# Patient Record
Sex: MALE | Race: WHITE
[De-identification: names, ages, dates, MRNs, and addresses within clinical notes are randomized per-mention and may not be internally consistent; named-entity substitution may affect disease eponyms.]

---

## 2024-02-05 ENCOUNTER — TRANSCRIBE ORDERS (OUTPATIENT)
Dept: OTHER | Age: 49
End: 2024-02-05

## 2024-02-05 ENCOUNTER — PATIENT OUTREACH (OUTPATIENT)
Dept: UROLOGY | Facility: CLINIC | Age: 49
End: 2024-02-05
Payer: COMMERCIAL

## 2024-02-05 DIAGNOSIS — N28.89 LEFT RENAL MASS: Primary | ICD-10-CM

## 2024-02-05 NOTE — TELEPHONE ENCOUNTER
Writer reached out to pt per the providers guidance per a direct referral.     No answer, writer not able to leave a VM .     Will continue to follow for scheduling.

## 2024-02-19 NOTE — TELEPHONE ENCOUNTER
Action 2024 JTV 12:12 PM    Action Taken CSS sent an urgent request to [employer] for images.  Trackin     Action 2024 jtv 1:55 PM    Action Taken CSS sent a 2nd request to [employer] for images.      Action 2024 Jtv 8:17 AM    Action Taken CSS called [employer]. CSS spoke with Radiology. Radiology dept confirmed that their Images release person is not available and will return call. CSS sent an urgent message to Nurse and Provider.     @9:19 AM, Film Release from [employer] returned call and stated that she will have the images burned onto a CD and mailed out end of today. CSS sent a message to Clinic Nurse, Staff, and Provider with update.    *Update Provider and Tonya once images has been received.     CSS called [employer] and confirmed that images cannot be pushed to . CSS sent an urgent request to [employer] for images.     Trackin       Action 24  ELLIS 10:52 AM    Action Taken  Imaging received. Uploaded and resolved in PACS. Dr. Long and Tonya notified.      MEDICAL RECORDS REQUEST   [facility]  Urology Clinic  [address] [place] [address]  PHONE: [phone]  Fax: [phone]        FUTURE VISIT INFORMATION                                                   Bradford Ruelas, : 1975 scheduled for future visit at [facility]    APPOINTMENT INFORMATION:  Date: 2024  Provider:  Aurelio Long MD  Reason for Visit/Diagnosis: Left renal mass    REFERRAL INFORMATION:  Referring provider:  Abelardo Lowery MD, MD @ [facility]      RECORDS REQUESTED FOR VISIT                                                     NOTES  STATUS/DETAILS   OFFICE NOTE from referring provider  Yes, 2023 -- Abelardo Lowery MD, MD @ [facility]   OFFICE NOTE from other specialist  Yes, 2023 -- Hang [facility]argas MD  [facility]    OPERATIVE REPORT  yes   MEDICATION LIST  yes   LABS     URINALYSIS (UA)  yes   IMAGES pending Yes, [facility]  01/23/2024 -- CT ABD   01/18/2024 -- US RETROPERITONEAL    [facility]   12/05/2023, 11/06/2023, 10/09/2023 -- CT ABD PELVIS       PRE-VISIT CHECKLIST      Joint diagnostic appointment coordinated correctly          (ensure right order & amount of time) Yes   RECORD COLLECTION COMPLETE If no, please explain pending

## 2024-02-22 ENCOUNTER — PRE VISIT (OUTPATIENT)
Dept: UROLOGY | Facility: CLINIC | Age: 49
End: 2024-02-22
Payer: COMMERCIAL

## 2024-02-22 NOTE — TELEPHONE ENCOUNTER
Reason for visit: consult      Dx/Hx/Sx: renal mass, left     Records/imaging/labs/orders: in epic     At Rooming: standard

## 2024-03-05 ENCOUNTER — PRE VISIT (OUTPATIENT)
Dept: UROLOGY | Facility: CLINIC | Age: 49
End: 2024-03-05

## 2024-03-05 ENCOUNTER — OFFICE VISIT (OUTPATIENT)
Dept: UROLOGY | Facility: CLINIC | Age: 49
End: 2024-03-05
Payer: COMMERCIAL

## 2024-03-05 VITALS — DIASTOLIC BLOOD PRESSURE: 79 MMHG | HEART RATE: 48 BPM | SYSTOLIC BLOOD PRESSURE: 113 MMHG

## 2024-03-05 DIAGNOSIS — N28.89 LEFT RENAL MASS: Primary | ICD-10-CM

## 2024-03-05 DIAGNOSIS — K43.9 VENTRAL HERNIA WITHOUT OBSTRUCTION OR GANGRENE: ICD-10-CM

## 2024-03-05 PROBLEM — F09 MILD COGNITIVE DISORDER: Status: ACTIVE | Noted: 2017-11-20

## 2024-03-05 PROBLEM — E66.01 MORBID OBESITY DUE TO EXCESS CALORIES (H): Status: ACTIVE | Noted: 2017-06-15

## 2024-03-05 PROBLEM — I89.0 LYMPHEDEMA: Status: ACTIVE | Noted: 2021-12-11

## 2024-03-05 PROBLEM — K42.9 UMBILICAL HERNIA WITHOUT OBSTRUCTION OR GANGRENE: Status: ACTIVE | Noted: 2021-02-22

## 2024-03-05 PROBLEM — F10.21 ALCOHOL DEPENDENCE IN REMISSION (H): Status: ACTIVE | Noted: 2017-11-20

## 2024-03-05 PROBLEM — E88.810 METABOLIC SYNDROME: Status: ACTIVE | Noted: 2023-09-21

## 2024-03-05 PROCEDURE — 99204 OFFICE O/P NEW MOD 45 MIN: CPT | Performed by: UROLOGY

## 2024-03-05 RX ORDER — ASPIRIN 325 MG
325 TABLET ORAL
COMMUNITY

## 2024-03-05 RX ORDER — ATENOLOL 100 MG/1
200 TABLET ORAL DAILY
COMMUNITY
Start: 2022-08-13

## 2024-03-05 RX ORDER — SACCHAROMYCES BOULARDII 250 MG
250 CAPSULE ORAL
COMMUNITY
Start: 2024-01-16 | End: 2024-07-12

## 2024-03-05 RX ORDER — HYDROCHLOROTHIAZIDE 12.5 MG/1
CAPSULE ORAL
COMMUNITY
Start: 2023-10-07

## 2024-03-05 RX ORDER — AMLODIPINE BESYLATE 10 MG/1
TABLET ORAL
COMMUNITY
Start: 2022-06-28

## 2024-03-05 RX ORDER — SEMAGLUTIDE 0.68 MG/ML
0.5 INJECTION, SOLUTION SUBCUTANEOUS
COMMUNITY
Start: 2023-09-21 | End: 2024-03-28

## 2024-03-05 RX ORDER — POTASSIUM CHLORIDE 1500 MG/1
40 TABLET, EXTENDED RELEASE ORAL 2 TIMES DAILY
COMMUNITY

## 2024-03-05 RX ORDER — OXYCODONE HYDROCHLORIDE 5 MG/1
10 TABLET ORAL
COMMUNITY
Start: 2022-09-05

## 2024-03-05 RX ORDER — DULOXETIN HYDROCHLORIDE 30 MG/1
30 CAPSULE, DELAYED RELEASE ORAL DAILY
COMMUNITY
Start: 2022-11-23

## 2024-03-05 RX ORDER — GABAPENTIN 300 MG/1
CAPSULE ORAL
COMMUNITY
Start: 2024-01-16

## 2024-03-05 RX ORDER — HYDROXYZINE PAMOATE 25 MG/1
25-50 CAPSULE ORAL
COMMUNITY
Start: 2023-10-30

## 2024-03-05 RX ORDER — SUMATRIPTAN 100 MG/1
100 TABLET, FILM COATED ORAL
COMMUNITY
Start: 2022-09-06

## 2024-03-05 ASSESSMENT — PAIN SCALES - GENERAL: PAINLEVEL: SEVERE PAIN (6)

## 2024-03-05 ASSESSMENT — PATIENT HEALTH QUESTIONNAIRE - PHQ9: SUM OF ALL RESPONSES TO PHQ QUESTIONS 1-9: 11

## 2024-03-05 NOTE — OUTPATIENT NURSE NOTE
Spoke with pt regarding depression screening- no current SI or plan to harm self. Pt endorses situational depression related to diagnosis. Offered mental health resources to pt, declines follow up at this time.    Gerda Avalos RN
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
## 2024-03-05 NOTE — NURSING NOTE
Chief Complaint   Patient presents with    Consult     Left kidney mass       Blood pressure 113/79, pulse (!) 48. There is no height or weight on file to calculate BMI.    There is no problem list on file for this patient.      Allergies   Allergen Reactions    Gramineae Pollens Itching       Current Outpatient Medications   Medication Sig Dispense Refill    amLODIPine (NORVASC) 10 MG tablet       aspirin (ASA) 325 MG tablet Take 325 mg by mouth      atenolol (TENORMIN) 100 MG tablet       DULoxetine (CYMBALTA) 30 MG capsule Take one capsule PO QAM and two capsules PO QHS      gabapentin (NEURONTIN) 300 MG capsule Take 300 mg by mouth 4 times daily      hydrochlorothiazide (MICROZIDE) 12.5 MG capsule       hydrOXYzine palomo (VISTARIL) 25 MG capsule Take 25-50 mg by mouth      oxyCODONE (ROXICODONE) 5 MG tablet Take 10 mg by mouth      potassium chloride eladio ER (KLOR-CON M20) 20 MEQ CR tablet Take 40 mEq by mouth      saccharomyces boulardii (FLORASTOR) 250 MG capsule Take 250 mg by mouth      semaglutide (OZEMPIC, 0.25 OR 0.5 MG/DOSE,) 2 MG/3ML pen Inject 0.5 mg Subcutaneous      SUMAtriptan (IMITREX) 100 MG tablet Take 100 mg by mouth         Social History     Tobacco Use    Smoking status: Never     Passive exposure: Never    Smokeless tobacco: Never       Rainer Gomez  3/5/2024  4:15 PM

## 2024-03-05 NOTE — PATIENT INSTRUCTIONS
Please follow up with Dr. Long in 6 months with a CT and labs.    We will obtain outside scans and review. Will call if changes in plan.  Plan continued weight loss.  Follow-up in 6 months with scan, labs, and follow-up with Dr. Long, as well as hernia surgeon at [facility].
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
## 2024-03-06 NOTE — TELEPHONE ENCOUNTER
REFERRAL INFORMATION:  Referring Provider:  Aurelio Long MD   Referring Clinic:  internal UROLOGY DEPT   Reason for Visit/Diagnosis: Ventral hernia without obstruction or gangrene        FUTURE VISIT INFORMATION:  Appointment Date: 3/8/2024  Appointment Time: 1:45pm      NOTES RECORD STATUS  DETAILS   OFFICE NOTE from Referring Provider Internal    OFFICE NOTE from Other Specialists Internal Urology Office Visit- 3/5/2024   PERTINENT LABS Internal    IMAGING (CT, MRI, US, XR)  Care Everywhere- [facility] CT Abdomen Pelvis 1/23/2024      Records Requested    Facility  [facility] [place] Ph: [phone]     Outcome 3/6/2024 11:12AM MOO     I called [facility] - Flory Anne requested the scans to mailed out on a DICOM disc for the Urology appt. [facility] confirmed the images should arrive today.    * 3/7/24 2:23 PM Images received from [facility] and uploaded into PACs. - Sarah

## 2024-03-08 ENCOUNTER — PRE VISIT (OUTPATIENT)
Dept: SURGERY | Facility: CLINIC | Age: 49
End: 2024-03-08

## 2024-03-08 ENCOUNTER — OFFICE VISIT (OUTPATIENT)
Dept: SURGERY | Facility: CLINIC | Age: 49
End: 2024-03-08
Attending: UROLOGY
Payer: COMMERCIAL

## 2024-03-08 VITALS
DIASTOLIC BLOOD PRESSURE: 76 MMHG | HEIGHT: 74 IN | BODY MASS INDEX: 40.43 KG/M2 | SYSTOLIC BLOOD PRESSURE: 144 MMHG | HEART RATE: 54 BPM | OXYGEN SATURATION: 96 % | WEIGHT: 315 LBS

## 2024-03-08 DIAGNOSIS — K43.9 VENTRAL HERNIA WITHOUT OBSTRUCTION OR GANGRENE: ICD-10-CM

## 2024-03-08 PROCEDURE — 99203 OFFICE O/P NEW LOW 30 MIN: CPT | Performed by: SURGERY

## 2024-03-08 ASSESSMENT — PAIN SCALES - GENERAL: PAINLEVEL: NO PAIN (0)

## 2024-03-08 NOTE — PROGRESS NOTES
"New Hernia Consultation Note      Bradford Ruelas  [id number]  1975    Requesting Provider: Aurelio Terrell    Dear Hang Vargas MD,    I was asked by Aurelio Long to see this patient for the following problem: Bradford Ruelas is a 48 year old male who presents to clinic today for the following health issues       CHIEF COMPLAINT:      3/8/2024     2:05 PM   Chief Complaint Reviewed With Patient   I am here today to be seen for: Umbilical Hernia         ASSESSMENT/PLAN:  ventral  Hernia size is more than 5cm in size.    Assessment & Plan   Problem List Items Addressed This Visit    None  Visit Diagnoses       Ventral hernia without obstruction or gangrene        Relevant Orders    Adult Comprehensive Weight Management  Referral         30 minutes spent by me on the date of the encounter doing chart review, history and exam, documentation and further activities per the note    Needs weight of 240 to seriously consider complex VHR (with or without concomitant partial nephrectomy); discussed that recurrence is guaranteed without signfiicant weight loss    MWM consult for aggressive AOMs      Thomas Guerrero MD              HISTORY OF PRESENT ILLNESS:  Location: upper ventral wall  Severity: Moderate    Long standin VH from 2007 trauma laparotomy. BMI 47.     Pending partial nephrectomy (likely from L paramedian rather than midline)             3/8/2024     2:05 PM   Timing of Hernia Reviewed With Patient   The onset of my hernia symptoms was: Gradual   My symptoms are: Constant           NUTRITIONAL STATUS:  No results found for: \"ALBUMIN\"    Body mass index is 46.34 kg/m .    Patient is not immunosuppressed.    Patient is not a current smoker.    No past medical history on file.    Patient Active Problem List   Diagnosis    Alcohol dependence in remission (H)    Episodic mood disorder (H24)    Lymphedema    Metabolic syndrome    Mild cognitive disorder    Morbid obesity " "due to excess calories (H)    Primary hypertension    Renal mass    Umbilical hernia without obstruction or gangrene       No past surgical history on file.    MEDICATIONS:  Current Outpatient Medications   Medication    amLODIPine (NORVASC) 10 MG tablet    aspirin (ASA) 325 MG tablet    atenolol (TENORMIN) 100 MG tablet    DULoxetine (CYMBALTA) 30 MG capsule    gabapentin (NEURONTIN) 300 MG capsule    hydrochlorothiazide (MICROZIDE) 12.5 MG capsule    hydrOXYzine palomo (VISTARIL) 25 MG capsule    oxyCODONE (ROXICODONE) 5 MG tablet    potassium chloride eladio ER (KLOR-CON M20) 20 MEQ CR tablet    saccharomyces boulardii (FLORASTOR) 250 MG capsule    semaglutide (OZEMPIC, 0.25 OR 0.5 MG/DOSE,) 2 MG/3ML pen    SUMAtriptan (IMITREX) 100 MG tablet     No current facility-administered medications for this visit.       ALLERGIES:  Allergies   Allergen Reactions    Gramineae Pollens Itching       Social History     Socioeconomic History    Marital status: Patient Declined     Spouse name: None    Number of children: None    Years of education: None    Highest education level: None   Tobacco Use    Smoking status: Never     Passive exposure: Never    Smokeless tobacco: Never       No family history on file.    ROS    na    PHYSICAL EXAM:  Objective    BP (!) 144/76 (BP Location: Left arm, Patient Position: Sitting, Cuff Size: Adult Regular)   Pulse 54   Ht 1.88 m (6' 2\")   Wt (!) 163.7 kg (360 lb 14.4 oz)   SpO2 96%   BMI 46.34 kg/m    BP (!) 144/76 (BP Location: Left arm, Patient Position: Sitting, Cuff Size: Adult Regular)   Pulse 54   Ht 1.88 m (6' 2\")   Wt (!) 163.7 kg (360 lb 14.4 oz)   SpO2 96%   BMI 46.34 kg/m    Body mass index is 46.34 kg/m .  Physical Exam   Excess abdominal weight with alrge VH  Sincerely,    Thomas Guerrero MD      "

## 2024-03-08 NOTE — NURSING NOTE
"Chief Complaint   Patient presents with    New Patient     Ventral hernia       Vitals:    03/08/24 1405   BP: (!) 144/76   BP Location: Left arm   Patient Position: Sitting   Cuff Size: Adult Regular   Pulse: 54   SpO2: 96%   Weight: (!) 163.7 kg (360 lb 14.4 oz)   Height: 1.88 m (6' 2\")       Body mass index is 46.34 kg/m .                          Lcuas Larson, EMT    "

## 2024-03-08 NOTE — LETTER
"3/8/2024       RE: Bradford Ruelas   [facility] [address]     Dear Colleague,    Thank you for referring your patient, Bradford Ruelas, to the [facility] [place] at [facility]. Please see a copy of my visit note below.    New Hernia Consultation Note      Bradford Ruelas  [id number]  1975    Requesting Provider: Aurelio Terrell    Dear Hang Vargas MD,    I was asked by Aurelio Long to see this patient for the following problem: Bradford Ruelas is a 48 year old male who presents to clinic today for the following health issues       CHIEF COMPLAINT:      3/8/2024     2:05 PM   Chief Complaint Reviewed With Patient   I am here today to be seen for: Umbilical Hernia         ASSESSMENT/PLAN:  ventral  Hernia size is more than 5cm in size.    Assessment & Plan  Problem List Items Addressed This Visit    None  Visit Diagnoses       Ventral hernia without obstruction or gangrene        Relevant Orders    Adult Comprehensive Weight Management  Referral         30 minutes spent by me on the date of the encounter doing chart review, history and exam, documentation and further activities per the note    Needs weight of 240 to seriously consider complex VHR (with or without concomitant partial nephrectomy); discussed that recurrence is guaranteed without signfiicant weight loss    MW consult for aggressive AOMs      Thomas Guerrero MD              HISTORY OF PRESENT ILLNESS:  Location: upper ventral wall  Severity: Moderate    Long standin VH from 2007 trauma laparotomy. BMI 47.     Pending partial nephrectomy (likely from L paramedian rather than midline)             3/8/2024     2:05 PM   Timing of Hernia Reviewed With Patient   The onset of my hernia symptoms was: Gradual   My symptoms are: Constant           NUTRITIONAL STATUS:  No results found for: \"ALBUMIN\"    Body mass " "index is 46.34 kg/m .    Patient is not immunosuppressed.    Patient is not a current smoker.    No past medical history on file.    Patient Active Problem List   Diagnosis    Alcohol dependence in remission (H)    Episodic mood disorder (H24)    Lymphedema    Metabolic syndrome    Mild cognitive disorder    Morbid obesity due to excess calories (H)    Primary hypertension    Renal mass    Umbilical hernia without obstruction or gangrene       No past surgical history on file.    MEDICATIONS:  Current Outpatient Medications   Medication    amLODIPine (NORVASC) 10 MG tablet    aspirin (ASA) 325 MG tablet    atenolol (TENORMIN) 100 MG tablet    DULoxetine (CYMBALTA) 30 MG capsule    gabapentin (NEURONTIN) 300 MG capsule    hydrochlorothiazide (MICROZIDE) 12.5 MG capsule    hydrOXYzine palomo (VISTARIL) 25 MG capsule    oxyCODONE (ROXICODONE) 5 MG tablet    potassium chloride eladio ER (KLOR-CON M20) 20 MEQ CR tablet    saccharomyces boulardii (FLORASTOR) 250 MG capsule    semaglutide (OZEMPIC, 0.25 OR 0.5 MG/DOSE,) 2 MG/3ML pen    SUMAtriptan (IMITREX) 100 MG tablet     No current facility-administered medications for this visit.       ALLERGIES:  Allergies   Allergen Reactions    Gramineae Pollens Itching       Social History     Socioeconomic History    Marital status: Patient Declined     Spouse name: None    Number of children: None    Years of education: None    Highest education level: None   Tobacco Use    Smoking status: Never     Passive exposure: Never    Smokeless tobacco: Never       No family history on file.    ROS    na    PHYSICAL EXAM:  Objective   BP (!) 144/76 (BP Location: Left arm, Patient Position: Sitting, Cuff Size: Adult Regular)   Pulse 54   Ht 1.88 m (6' 2\")   Wt (!) 163.7 kg (360 lb 14.4 oz)   SpO2 96%   BMI 46.34 kg/m    BP (!) 144/76 (BP Location: Left arm, Patient Position: Sitting, Cuff Size: Adult Regular)   Pulse 54   Ht 1.88 m (6' 2\")   Wt (!) 163.7 kg (360 lb 14.4 oz)   SpO2 96% "   BMI 46.34 kg/m    Body mass index is 46.34 kg/m .  Physical Exam   Excess abdominal weight with alrge VH        Again, thank you for allowing me to participate in the care of your patient.      Sincerely,    Thomas Guerrero MD

## 2024-03-12 ENCOUNTER — PATIENT OUTREACH (OUTPATIENT)
Dept: UROLOGY | Facility: CLINIC | Age: 49
End: 2024-03-12
Payer: COMMERCIAL

## 2024-03-12 NOTE — TELEPHONE ENCOUNTER
Writer reached out to pt on behalf of the provider to inform him that the provider has reviewed the imaging and recommends pt to continue with the weight loss plan that has been previously discussed.     Pt notes that he met with a general surgeon for his hernia repair on Friday and per their recommendations, they want pt to be down to 240# before considering surgery. Pt reports that his current weight is 360#. Writer provided support.     Writer also informed pt that the provider would like to see him back in 6 months with repeat imaging. Pt has an appt scheduled for Sept.     Will continue to follow as needed.

## 2024-03-28 ENCOUNTER — TELEPHONE (OUTPATIENT)
Dept: ENDOCRINOLOGY | Facility: CLINIC | Age: 49
End: 2024-03-28

## 2024-03-28 ENCOUNTER — OFFICE VISIT (OUTPATIENT)
Dept: ENDOCRINOLOGY | Facility: CLINIC | Age: 49
End: 2024-03-28
Attending: SURGERY
Payer: COMMERCIAL

## 2024-03-28 VITALS
HEART RATE: 52 BPM | DIASTOLIC BLOOD PRESSURE: 79 MMHG | RESPIRATION RATE: 16 BRPM | SYSTOLIC BLOOD PRESSURE: 127 MMHG | WEIGHT: 315 LBS | HEIGHT: 74 IN | OXYGEN SATURATION: 99 % | BODY MASS INDEX: 40.43 KG/M2

## 2024-03-28 DIAGNOSIS — E66.813 CLASS 3 SEVERE OBESITY WITH SERIOUS COMORBIDITY AND BODY MASS INDEX (BMI) OF 45.0 TO 49.9 IN ADULT, UNSPECIFIED OBESITY TYPE (H): Primary | ICD-10-CM

## 2024-03-28 DIAGNOSIS — E66.01 CLASS 3 SEVERE OBESITY WITH SERIOUS COMORBIDITY AND BODY MASS INDEX (BMI) OF 45.0 TO 49.9 IN ADULT, UNSPECIFIED OBESITY TYPE (H): Primary | ICD-10-CM

## 2024-03-28 DIAGNOSIS — K43.9 VENTRAL HERNIA WITHOUT OBSTRUCTION OR GANGRENE: ICD-10-CM

## 2024-03-28 PROBLEM — G44.229 CHRONIC TENSION HEADACHES: Status: RESOLVED | Noted: 2019-08-05 | Resolved: 2024-03-28

## 2024-03-28 PROBLEM — R74.02 ELEVATION OF LEVELS OF LACTIC ACID DEHYDROGENASE (LDH): Status: RESOLVED | Noted: 2018-11-05 | Resolved: 2024-03-28

## 2024-03-28 PROBLEM — Z22.322 MRSA (METHICILLIN RESISTANT STAPH AUREUS) CULTURE POSITIVE: Status: ACTIVE | Noted: 2021-12-11

## 2024-03-28 PROBLEM — M79.604 LEG PAIN, DIFFUSE, RIGHT: Status: ACTIVE | Noted: 2021-12-21

## 2024-03-28 PROCEDURE — 99205 OFFICE O/P NEW HI 60 MIN: CPT
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
## 2024-03-28 NOTE — PATIENT INSTRUCTIONS
"Pascual Felder, it was nice to meet you today!  Thank you for allowing us the privilege of caring for you. We hope we provided you with the excellent service you deserve.   Please let us know if there is anything else we can do for you so that we can be sure you are completely satisfied with your care experience.    To ensure the quality of our services you may be receiving a patient satisfaction survey from an independent patient satisfaction monitoring company.    The greatest compliment you can give is a \"Likely to Recommend\"    Your visit was with Maria M Lui PA-C today.    Instructions per today's visit:     Will reach out to psychiatrist about starting Phentermine or Topiramate  MTM pharmacist in 6 weeks after medication start   Daphne Murphy, Cesilia, or Lulu in 3 months     ___________________________________________________________________________  Important contact and scheduling information:  Please call our contact center at [phone] to schedule your next appointments.  For any nursing questions or concerns call Pattie Love LPN at [phone] or Sarah Sotomayor RN at [phone]  Please call during clinic hours Monday through Friday 8:00a - 4:00p if you have questions or you can contact us via FND at anytime and we will reply during clinic hours.    Lab results will be communicated through My Chart or letter (if My Chart not used). Please call the clinic if you have not received communication after 1 week or if you have any questions.?  Clinic Fax: [phone]  __________________________________________________________________________    If labs were ordered today:    Please make an appointment to have them drawn at your convenience.     To schedule the Lab Appointment using FND:  Select \"Schedule an Appointment\"  Select \"Lab Only\"  For \"A couple of questions\", select \"Other\"  For \"Which locations work for you?, select the location and set up the appointment    To schedule by phone call " [phone] to schedule a lab only appointment at any [facility] lab.  ___________________________________________________________________________  Work with [facility] !  Virtual Sessions are Available through [facility] Weight Management Clinics    To learn more, call to schedule a free, Health  Q&A appointment: [phone]     What is Health Coaching?  Do you know what you are supposed to do, but you just aren't doing it?  Then, HEALTH COACHING may help you!   Get unstuck and move forward with the support of a professionally trained NBC-HWC (National Board-Certified Health and ) who uses evidence-based approaches to help you move forward with healthy lifestyle changes in the areas of weight loss, stress management and overall well-being.    Health Coaches help you identify goals that will work best for you. Health Coaches provide support and encouragement with overcoming barriers and help you to find inspiration and motivation to lead a healthy lifestyle.    Option one:  Health Coaching 3-Pack; Three, 30-minute Health Coaching Visits, for $99  Visits are done virtually (phone or video)  This is a self pay service; we do not accept insurance for john coaching.    Option two:   The 24 week Plan; 11 Health Coaching Visits, and a 7 months subscription to [employer]-- on-demand fitness, nutrition and mindfulness classes, for $499 (employee discounts may be available). Participants will also meet regularly with a weight management Medical Provider and a Registered/Licensed Dietician.  This is a self-pay service; we do not accept insurance for health coaching.    To Schedule a free Health  Q&A appointment to learn more,  call [phone].  ____________________________________________________________________  [facility]  Healthy Lifestyle Group    Healthy Lifestyle Group  This is a 60 minute virtual coaching group for those who want  "to lead a healthier lifestyle. Come together to set goals and overcome barriers in a supportive group environment. We will address the four pillars of health--nutrition, exercise, sleep and emotional well-being.  This group is highly recommended for those who are participating in the 24 week Healthy Lifestyle Plan and our Health Coaching sessions.    WHEN: This group meets the first Friday of the month, 12:30 PM - 1:30 PM online, via a zoom meeting.      FACILITATOR: Led by National Board Certified Health and , Yelitza Wolff [facility].    TO REGISTER: Please call the Call Center at [phone] to register. You will get an appointment to attend in [place]. Fifteen minutes prior to the meeting, complete the e-check in and you will get the link to join the meeting.  There is no charge to attend this group and space is limited.      2023 and 2024 Meeting Topics and Dates:    November 3: Introduction to Mindfulness (Learn simple and effective mindfulness practices and how it can benefit you)    December 8: Let's Talk (guided discussion on our wins and challenges)    January 5: New Years Vision: Manifest your Best 2024! (Guided imagery,  journaling and discussion)    February 2: Let's Talk    March 1: 10 Percent Happier by Raj Godwin (Book Bites; a guided discussion on the nuggets of wisdom from favorite wellness books; no need to read the book but highly encouraged)    April 5: Let's Talk    May 3: \"Essentialism; The Disciplined Pursuit of Less by Tyson Fortune (book bites discussion)    June 7: Let's Talk    July 5: NO MEETING, off for the 4th of July Holiday    August 2: The Blue Zones, Secrets for Living a Longer Life by Raj Faria (book bites discussion)      If you would like bariatric surgery specific support group info please let your care team know.         Thank you,   [facility] Comprehensive Weight Management Team      PHENTERMINE    We are considering starting Phentermine. Take one tablet " in the morning.      Phentermine is being prescribed because you identified hunger as one of the main causes for your extra weight.      Our patients on Phentermine find that they:    >feel less hunger    >find it easier to push the plate away   >have an easier time eating less    For some of our patients, these feelings are very real and immediate. For other patients, the feelings are less obvious. They don't feel much of a change but find they've lost weight. Like all weight loss medications, Phentermine  works best when you help it work. This means:  1. Having less tempting high calorie (fattening) food around the house or office. (For people with strong cravings this is very important.)   2. Staying away from situations or people that may trigger your cravings .   3. Eating out only one time or less each week.  4. Eating your meals at a table with the TV or computer off.    Side-effects. Phentermine is generally well tolerated. The main side-effects we see are feelings of racing pulse or rapid heart beat. Some people can get an elevated blood pressure. Because of this we may have you come back within a week or so of starting the medication for a blood pressure check.        For any questions or concerns please send a EarlySense message to our team or call our weight management call center at [phone] during regular business hours. For questions during evenings or weekends your messages will be addressed during the next business day.  For emergencies please call 911 or seek immediate medical care.      In order to get refills of this or any medication we prescribe you must be seen in the medical weight mgmt clinic every 2-3 months. Please have your pharmacy fax a refill request to [phone].      TOPIRAMATE (TOPAMAX)    We are considering starting topiramate at bedtime.  Start one tab, 25 mg, for a week. Go up to 50 mg (2 tabs) for the next week. At the third week, take  3 tabs (75 mg).  Stay at 3 tabs until  you are seen again.       Topiramate (Topamax) is a medication that is used most often to treat migraine headaches or for seizures. It has also been found to help with weight loss. Although it's not currently FDA approved for weight loss, it has been used safely for a number of years to help people who are carrying extra weight.     Just how topiramate helps with weight loss has not been exactly determined. However it seems to work on areas of the brain to quiet down signals related to eating.      Topiramate may make you:    >feel less interest in eating in between meals   >think less about food and eating   >find it easier to push the plate away   >find giving up pop easier    >have an easier time eating less    For some of our patients, the pills work right away. They feel and think quite differently about food. Other patients don't feel much of a change but find in fact they have lost weight! Like all weight loss medications, topiramate works best when you help it work.  This means:    1) Have less tempting high calorie (fattening) food around the house or office    2) Have lower calorie food (fruits, vegetables,low fat meats and dairy) for snacks    3) Eat out only one time or less each week.   4) Eat your meals at a table with the TV or computer off.    Side-effects. Topiramate is generally well tolerated. The main side-effects we see are:   Tingling in hands,feet, or face (usually not very troublesome)   Mental confusion and word finding trouble (about 10% of patients have this.)     Feeling sleepy or a bit dopey- this goes away very soon after starting.    One of the dangers of topiramate is the possibility of birth defects--if you get pregnant when you are on it, there is the risk that your baby will be born with a cleft lip or palate.  If you are on topiramate and of child bearing age, you need to be on a reliable form of birth control or refrain from sexual intercourse.     Please refer to the pharmacy  insert for more information on side-effects. Since many pharmacists are not familiar with the use of topiramate in weight loss, calling the clinic will get you the most accurate information on the use of this medication for weight loss.    For any questions or concerns please send a [employer]Match message to our team or call our weight management call center at [phone] during regular business hours. For questions during evenings or weekends your messages will be addressed during the next business day.  For emergencies please call 911 or seek immediate medical care.     (Do not stop taking it if you don't think it's working. For some people it works even though they do not feel much different.)     In order to get refills of this or any medication we prescribe you must be seen in the medical weight mgmt clinic every 3-6 months. Please have your pharmacy fax a refill request to [phone].

## 2024-03-28 NOTE — TELEPHONE ENCOUNTER
Please complete forms before appointment on MyChart or in-office. Takes about 1 hour so please arrive at 2pm for 3pm appointment to complete them in office before appointment.
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
## 2024-04-02 ENCOUNTER — TELEPHONE (OUTPATIENT)
Dept: ENDOCRINOLOGY | Facility: CLINIC | Age: 49
End: 2024-04-02
Payer: COMMERCIAL

## 2024-04-02 PROBLEM — E66.813 CLASS 3 SEVERE OBESITY WITH SERIOUS COMORBIDITY AND BODY MASS INDEX (BMI) OF 45.0 TO 49.9 IN ADULT, UNSPECIFIED OBESITY TYPE (H): Status: ACTIVE | Noted: 2017-06-15
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
## 2024-04-02 NOTE — TELEPHONE ENCOUNTER
Called Dr. Soliz, psychiatrist, to discuss starting weight loss medications in regards to mental health. 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 Reviewed starting either phentermine or Topiramate. Nurse of Dr. Soliz called back and stated he had no concern with starting either of these medications. I will discuss further with patient.     Maria M Lui PA-C  [facility]

## 2024-04-03 ENCOUNTER — TELEPHONE (OUTPATIENT)
Dept: ENDOCRINOLOGY | Facility: CLINIC | Age: 49
End: 2024-04-03
Payer: COMMERCIAL

## 2024-04-03 DIAGNOSIS — E66.01 CLASS 3 SEVERE OBESITY WITH SERIOUS COMORBIDITY AND BODY MASS INDEX (BMI) OF 45.0 TO 49.9 IN ADULT, UNSPECIFIED OBESITY TYPE (H): Primary | ICD-10-CM

## 2024-04-03 DIAGNOSIS — E66.813 CLASS 3 SEVERE OBESITY WITH SERIOUS COMORBIDITY AND BODY MASS INDEX (BMI) OF 45.0 TO 49.9 IN ADULT, UNSPECIFIED OBESITY TYPE (H): Primary | ICD-10-CM

## 2024-04-03 RX ORDER — PHENTERMINE HYDROCHLORIDE 15 MG/1
15 CAPSULE ORAL EVERY MORNING
Qty: 30 CAPSULE | Refills: 1 | Status: SHIPPED | OUTPATIENT
Start: 2024-04-03 | End: 2024-06-10

## 2024-04-03 NOTE — TELEPHONE ENCOUNTER
Called patient to discuss weight loss medications. Reviewed Phentermine and Topiramate. He is concerned about starting Topiramate for baseline paresthesia and brain fog. Will send in phentermine and will monitor blood pressure and mood. No contraindications. Discussed side effects, risks, and benefits.    Plan:   - Phentermine 15mg in the morning. Check blood pressure in 1-2 weeks after start   - MTM pharmacist in 4 weeks   - Daphne Murphy, Cesilia, or Lulu in 3 months     Maria M Lui PA-C  [facility]

## 2024-06-10 ENCOUNTER — TELEPHONE (OUTPATIENT)
Dept: ENDOCRINOLOGY | Facility: CLINIC | Age: 49
End: 2024-06-10
Payer: COMMERCIAL

## 2024-06-10 DIAGNOSIS — E66.813 CLASS 3 SEVERE OBESITY WITH SERIOUS COMORBIDITY AND BODY MASS INDEX (BMI) OF 45.0 TO 49.9 IN ADULT, UNSPECIFIED OBESITY TYPE (H): ICD-10-CM

## 2024-06-10 DIAGNOSIS — E66.01 CLASS 3 SEVERE OBESITY WITH SERIOUS COMORBIDITY AND BODY MASS INDEX (BMI) OF 45.0 TO 49.9 IN ADULT, UNSPECIFIED OBESITY TYPE (H): ICD-10-CM

## 2024-06-10 RX ORDER — PHENTERMINE HYDROCHLORIDE 15 MG/1
15 CAPSULE ORAL EVERY MORNING
Qty: 30 CAPSULE | Refills: 0 | Status: SHIPPED | OUTPATIENT
Start: 2024-06-10 | End: 2024-06-26

## 2024-06-10 RX ORDER — PHENTERMINE HYDROCHLORIDE 15 MG/1
15 CAPSULE ORAL EVERY MORNING
Qty: 30 CAPSULE | Refills: 0 | OUTPATIENT
Start: 2024-06-10

## 2024-06-10 NOTE — TELEPHONE ENCOUNTER
Pt. would like a refill on his medication and he took the last one on last Friday. Pt. Is on the third day without the medication.He called in the prescription on Wednesday. Pt. States the [facility] stated that he did not have any refills. Pt. also needs direction on dosage. Pt. Has been following the direction of Maria M Lui with one pill in the morning. The pharmacist at the [facility] stated that Pt. Was supposed to take the prescription once in the morning and once in the evening.  Pt. states that the pharmacy in [place] stated that he should be taking the medication in the morning and at bed time. Please advise Pt. At [phone].

## 2024-06-10 NOTE — TELEPHONE ENCOUNTER
phentermine 15 MG capsule   Disp-30 capsule, R-1,   Start: 04/03/2024     3/28/2024  [facility] Maria M Peters PA-C  Surgery    Nv: 7/8/24    Routed because: controlled.

## 2024-06-10 NOTE — TELEPHONE ENCOUNTER
Medication Question or Refill    Contacts         Type Contact Phone/Fax    06/10/2024 10:05 AM CDT Phone (Incoming) Bradford Ruelas (Self) [phone] (M)            What medication are you calling about (include dose and sig)?: Phentermine    Preferred Pharmacy:[facility] [address] [address]  Phone: [phone] Fax: [phone]      Controlled Substance Agreement on file:   CSA -- Patient Level:    CSA: None found at the patient level.       Who prescribed the medication?: chana ohara brina    Do you need a refill? Yes    When did you use the medication last? 6/6      Do you have any questions or concerns?  No      Okay to leave a detailed message?: Yes at Cell number on file:    Telephone Information:   Mobile [phone]

## 2024-06-11 ENCOUNTER — TELEPHONE (OUTPATIENT)
Dept: ENDOCRINOLOGY | Facility: CLINIC | Age: 49
End: 2024-06-11
Payer: COMMERCIAL
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
## 2024-06-11 NOTE — TELEPHONE ENCOUNTER
Called and spoke with patient in regards to Phentermine. Advised that PA was started today and will take 1-2 weeks. Patient states the medication has been helping curb his appetite. He feels that he has lost weight. He is on his way to an appointment now (while on the call) to [facility] for weight management. He is seeing Dr. Jeni Wynn. Advised patient that he should be taking Phentermine 15mg once daily in the morning. Asked that he get his current weight, blood pressure and pulse checked at today's visit. Will follow up next month as planned.

## 2024-06-11 NOTE — TELEPHONE ENCOUNTER
Medication Question or Refill    Contacts         Type Contact Phone/Fax    06/10/2024 04:57 PM CDT Phone (Incoming) Bradford Ruelas (Self) [phone] (M)            What medication are you calling about (include dose and sig)?: phentermine 15 MG capsule    Preferred Pharmacy:   [address] [address]  Phone: [phone] Fax: [phone]      Controlled Substance Agreement on file:   CSA -- Patient Level:    CSA: None found at the patient level.       Who prescribed the medication?: Maria M Lui    Do you need a refill? Yes    When did you use the medication last? 3 days ago    Patient offered an appointment? No    Do you have any questions or concerns?  Yes: patient stated his insurance isn't covering it. Patient needs a new prior auth. Patient also asking about dosing, does he need to take once daily or twice?      Okay to leave a detailed message?: Yes at Cell number on file:    Telephone Information:   Mobile [phone]

## 2024-06-12 ENCOUNTER — TELEPHONE (OUTPATIENT)
Dept: ENDOCRINOLOGY | Facility: CLINIC | Age: 49
End: 2024-06-12
Payer: COMMERCIAL

## 2024-06-12 NOTE — TELEPHONE ENCOUNTER
FYI - Status Update    Who is Calling: patient    Update: Patient called to update Maria M Lui that his weight yesterday was 348 lbs, his previous weight was 368 lbs on 5/11/24. BP was 120/69, heart rate 51 on 6/11/24. Oxygen sat was 95%. Body fat % is now 42.3% as of 6/11/24 per patient. Patient also inquiring about dosage for his medication.    Does caller want a call/response back: Yes, only if needed. He was just calling to provide an update.    Okay to leave a detailed message?: Yes at Cell number on file:    Telephone Information:   Mobile [phone]

## 2024-06-12 NOTE — TELEPHONE ENCOUNTER
Patient notified of refill.  Patient notified to try [facility] to see if he can get additional savings on the cost of the Rx.

## 2024-06-14 ENCOUNTER — TELEPHONE (OUTPATIENT)
Dept: ENDOCRINOLOGY | Facility: CLINIC | Age: 49
End: 2024-06-14
Payer: COMMERCIAL

## 2024-06-14 NOTE — TELEPHONE ENCOUNTER
Patient confirmed scheduled appointment:  Date: 6/24/24  Time: 12 pm  Visit type: NKECHI CLARK  Provider: Maria M Lui  Location: [facility] 4th floor  Testing/imaging: n/a  Additional notes: slot ok per JOSE

## 2024-06-19 NOTE — TELEPHONE ENCOUNTER
PA Initiation    Medication: PHENTERMINE HCL 15 MG PO CAPS  Insurance Company: [employer] - Phone [phone] Fax [phone]  Pharmacy Filling the Rx: [facility] [place], [place] - [address] [place] [address]  Filling Pharmacy Phone: [phone]  Filling Pharmacy Fax: [phone]  Start Date: 6/19/2024

## 2024-06-21 NOTE — TELEPHONE ENCOUNTER
PRIOR AUTHORIZATION DENIED    Medication: PHENTERMINE HCL 15 MG PO CAPS  Insurance Company: [employer] - Phone [phone] Fax [phone]  Denial Date: 9/19/2024  Denial Reason(s):       Appeal Information:       Patient Notified: NO

## 2024-06-24 ENCOUNTER — OFFICE VISIT (OUTPATIENT)
Dept: ENDOCRINOLOGY | Facility: CLINIC | Age: 49
End: 2024-06-24
Payer: COMMERCIAL

## 2024-06-24 VITALS
HEIGHT: 74 IN | OXYGEN SATURATION: 99 % | HEART RATE: 52 BPM | WEIGHT: 315 LBS | BODY MASS INDEX: 40.43 KG/M2 | SYSTOLIC BLOOD PRESSURE: 115 MMHG | DIASTOLIC BLOOD PRESSURE: 73 MMHG

## 2024-06-24 DIAGNOSIS — E66.01 CLASS 3 SEVERE OBESITY WITH SERIOUS COMORBIDITY AND BODY MASS INDEX (BMI) OF 45.0 TO 49.9 IN ADULT, UNSPECIFIED OBESITY TYPE (H): ICD-10-CM

## 2024-06-24 DIAGNOSIS — E66.813 CLASS 3 SEVERE OBESITY WITH SERIOUS COMORBIDITY AND BODY MASS INDEX (BMI) OF 45.0 TO 49.9 IN ADULT, UNSPECIFIED OBESITY TYPE (H): ICD-10-CM

## 2024-06-24 PROCEDURE — 99213 OFFICE O/P EST LOW 20 MIN: CPT

## 2024-06-24 PROCEDURE — G2211 COMPLEX E/M VISIT ADD ON: HCPCS

## 2024-06-24 ASSESSMENT — PAIN SCALES - GENERAL: PAINLEVEL: EXTREME PAIN (8)
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
## 2024-06-24 NOTE — PATIENT INSTRUCTIONS
"Pascual Felder,   Thank you for allowing us the privilege of caring for you. We hope we provided you with the excellent service you deserve.   Please let us know if there is anything else we can do for you so that we can be sure you are completely satisfied with your care experience.    To ensure the quality of our services you may be receiving a patient satisfaction survey from an independent patient satisfaction monitoring company.    The greatest compliment you can give is a \"Likely to Recommend\"    Your visit was with Maria M Lui PA-C today.    Instructions per today's visit:     Continue Phentermine 15mg in the morning. Refills sent   BRIAN Abreu pharmacist on 7/12/2024  Maria M Lui PA-C in 4 months     ___________________________________________________________________________  Important contact and scheduling information:  Please call our contact center at [phone] to schedule your next appointments.  For any nursing questions or concerns call Pattie Love LPN at [phone] or Sarah Sotomayor RN at [phone]  Please call during clinic hours Monday through Friday 8:00a - 4:00p if you have questions or you can contact us via [facility] at anytime and we will reply during clinic hours.    Lab results will be communicated through My Chart or letter (if My Chart not used). Please call the clinic if you have not received communication after 1 week or if you have any questions.?  Clinic Fax: [phone]  __________________________________________________________________________    If labs were ordered today:    Please make an appointment to have them drawn at your convenience.     To schedule the Lab Appointment using [facility]:  Select \"Schedule an Appointment\"  Select \"Lab Only\"  For \"A couple of questions\", select \"Other\"  For \"Which locations work for you?, select the location and set up the appointment    To schedule by phone call [phone] to schedule a lab only appointment at any [facility].  ___________________________________________________________________________  Work with A Health !  Virtual Sessions are Available through [facility]    To learn more, call to schedule a free, Health  Q&A appointment: [phone]     What is Health Coaching?  Do you know what you are supposed to do, but you just aren't doing it?  Then, HEALTH COACHING may help you!   Get unstuck and move forward with the support of a professionally trained NBC-HWC (National Board-Certified Health and ) who uses evidence-based approaches to help you move forward with healthy lifestyle changes in the areas of weight loss, stress management and overall well-being.    Health Coaches help you identify goals that will work best for you. Health Coaches provide support and encouragement with overcoming barriers and help you to find inspiration and motivation to lead a healthy lifestyle.    Option one:  Health Coaching 3-Pack; Three, 30-minute Health Coaching Visits, for $99  Visits are done virtually (phone or video)  This is a self pay service; we do not accept insurance for john coaching.    Option two:   The 24 week Plan; 11 Health Coaching Visits, and a 7 months subscription to [employer]-- on-demand fitness, nutrition and mindfulness classes, for $499 (employee discounts may be available). Participants will also meet regularly with a weight management Medical Provider and a Registered/Licensed Dietician.  This is a self-pay service; we do not accept insurance for health coaching.    To Schedule a free Health  Q&A appointment to learn more,  call [phone].  ____________________________________________________________________  [facility]  Healthy Lifestyle Group    Healthy Lifestyle Group  This is a 60 minute virtual coaching group for those who want to lead a healthier lifestyle. Come together to set goals and  "overcome barriers in a supportive group environment. We will address the four pillars of health--nutrition, exercise, sleep and emotional well-being.  This group is highly recommended for those who are participating in the 24 week Healthy Lifestyle Plan and our Health Coaching sessions.    WHEN: This group meets the first Friday of the month, 12:30 PM - 1:30 PM online, via a zoom meeting.      FACILITATOR: Led by National Board Certified Health and , Yelitza Wolff, [facility].    TO REGISTER: Please call the Call Center at [phone] to register. You will get an appointment to attend in [employer]. Fifteen minutes prior to the meeting, complete the e-check in and you will get the link to join the meeting.  There is no charge to attend this group and space is limited.      2023 and 2024 Meeting Topics and Dates:    November 3: Introduction to Mindfulness (Learn simple and effective mindfulness practices and how it can benefit you)    December 8: Let's Talk (guided discussion on our wins and challenges)    January 5: New Years Vision: Manifest your Best 2024! (Guided imagery,  journaling and discussion)    February 2: Let's Talk    March 1: 10 Percent Happier by Raj Godwin (Book Bites; a guided discussion on the nuggets of wisdom from favorite wellness books; no need to read the book but highly encouraged)    April 5: Let's Talk    May 3: \"Essentialism; The Disciplined Pursuit of Less by Tyson Fortune (book bites discussion)    June 7: Let's Talk    July 5: NO MEETING, off for the 4th of July Holiday    August 2: The Blue Zones, Secrets for Living a Longer Life by Raj Faria (book bites discussion)      If you would like bariatric surgery specific support group info please let your care team know.         Thank you,   [facility] Comprehensive Weight Management Team                          "

## 2024-06-26 RX ORDER — PHENTERMINE HYDROCHLORIDE 15 MG/1
15 CAPSULE ORAL EVERY MORNING
Qty: 30 CAPSULE | Refills: 4 | Status: SHIPPED | OUTPATIENT
Start: 2024-06-26

## 2024-07-10 ENCOUNTER — TELEPHONE (OUTPATIENT)
Dept: ENDOCRINOLOGY | Facility: CLINIC | Age: 49
End: 2024-07-10
Payer: COMMERCIAL

## 2024-07-10 NOTE — TELEPHONE ENCOUNTER
General Call    Contacts       Contact Date/Time Type Contact Phone/Fax    07/10/2024 08:15 AM CDT Phone (Incoming) Bradford Ruelas (Self) [phone] ()          Reason for Call:  call back    What are your questions or concerns:  pt would like a call back regarding weight loss surg      Okay to leave a detailed message?: Yes at Cell number on file:    Telephone Information:   Mobile [phone]

## 2024-07-10 NOTE — TELEPHONE ENCOUNTER
"Patient wondering about \"stomach stapling\" when he has his hernia repair.  Discussed the sleeve gastrectomy and what is involve to get to surgery.  Patient did not realize it was different than the surgery his friend had.  Is losing weight for hernia repair but concerned he will gain back the weight after surgery.  Discussed sticking to a good nutrition plan and exercising.  "

## 2024-07-12 ENCOUNTER — VIRTUAL VISIT (OUTPATIENT)
Dept: CARDIOLOGY | Facility: CLINIC | Age: 49
End: 2024-07-12
Payer: COMMERCIAL

## 2024-07-12 VITALS — WEIGHT: 315 LBS | HEIGHT: 74 IN | BODY MASS INDEX: 40.43 KG/M2

## 2024-07-12 DIAGNOSIS — E66.01 CLASS 3 SEVERE OBESITY DUE TO EXCESS CALORIES WITH SERIOUS COMORBIDITY AND BODY MASS INDEX (BMI) OF 40.0 TO 44.9 IN ADULT (H): Primary | ICD-10-CM

## 2024-07-12 DIAGNOSIS — E66.813 CLASS 3 SEVERE OBESITY DUE TO EXCESS CALORIES WITH SERIOUS COMORBIDITY AND BODY MASS INDEX (BMI) OF 40.0 TO 44.9 IN ADULT (H): Primary | ICD-10-CM

## 2024-07-12 DIAGNOSIS — I10 PRIMARY HYPERTENSION: ICD-10-CM

## 2024-07-12 DIAGNOSIS — M79.604 LEG PAIN, DIFFUSE, RIGHT: ICD-10-CM

## 2024-07-12 DIAGNOSIS — F39 EPISODIC MOOD DISORDER (H): ICD-10-CM

## 2024-07-12 ASSESSMENT — PAIN SCALES - GENERAL: PAINLEVEL: SEVERE PAIN (6)

## 2024-07-12 NOTE — NURSING NOTE
Current patient location: [address] [address]    Is the patient currently in the state of [place]? NO in [place]    Visit mode:TELEPHONE    If the visit is dropped, the patient can be reconnected by: TELEPHONE VISIT: Phone number:   Telephone Information:   Mobile [phone]       Will anyone else be joining the visit? NO  (If patient encounters technical issues they should call [phone]    How would you like to obtain your AVS? Mail a copy    Are changes needed to the allergy or medication list? No    Are refills needed on medications prescribed by this physician? NO, but would like a refill on the phentermine    Reason for visit: Consult    Elodia WALTERSF
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
## 2024-07-19 NOTE — PATIENT INSTRUCTIONS
"Recommendations from today's MTM visit:                                                      Continue phentermine 15 mg once daily   Try to get at least 7 hours of sleep per night. It's important to get enough sleep to help have energy and be active the next day.   Hold hydroxyzine in the morning to see if you feel less tired.   Consider increasing duloxetine dose to help with depression, anxiety, and pain- discuss with your primary care provider   Recheck potassium - has upcoming lab     Follow-up: 6 weeks    It was great speaking with you today.  I value your experience and would be very thankful for your time in providing feedback in our clinic survey. In the next few days, you may receive an email or text message from [employer] with a link to a survey related to your  clinical pharmacist.\"     To schedule another MTM appointment, please call the clinic directly or you may call the MTM scheduling line at [phone].    My Clinical Pharmacist's contact information:                                                      Please feel free to contact me with any questions or concerns you have.      Roselia Campa, PharmD, AAHIVP  Medication Therapy Management Pharmacist      "

## 2024-07-25 ENCOUNTER — TELEPHONE (OUTPATIENT)
Dept: UROLOGY | Facility: CLINIC | Age: 49
End: 2024-07-25
Payer: COMMERCIAL

## 2024-07-25 NOTE — TELEPHONE ENCOUNTER
Spoke to pt to get scheduled for CT Renal before visit in September with Dr. Lnog. Provided the pt with the imaging phone number. Pt understood and agreed to call to get scheduled.    Mallory Phelps RN, BSN  [facility]

## 2024-08-13 ENCOUNTER — TELEPHONE (OUTPATIENT)
Dept: UROLOGY | Facility: CLINIC | Age: 49
End: 2024-08-13
Payer: COMMERCIAL

## 2024-08-13 NOTE — TELEPHONE ENCOUNTER
Spoke to pt and got him scheduled for a lab visit before his CT scan. Pt is agreeable.    Mallory Phelps RN, BSN  [facility]

## 2024-08-19 ENCOUNTER — VIRTUAL VISIT (OUTPATIENT)
Dept: CARDIOLOGY | Facility: CLINIC | Age: 49
End: 2024-08-19
Payer: COMMERCIAL

## 2024-08-19 VITALS — BODY MASS INDEX: 44.42 KG/M2 | HEIGHT: 74 IN

## 2024-08-19 DIAGNOSIS — E66.01 CLASS 3 SEVERE OBESITY DUE TO EXCESS CALORIES WITH SERIOUS COMORBIDITY AND BODY MASS INDEX (BMI) OF 40.0 TO 44.9 IN ADULT (H): Primary | ICD-10-CM

## 2024-08-19 DIAGNOSIS — M79.604 LEG PAIN, DIFFUSE, RIGHT: ICD-10-CM

## 2024-08-19 DIAGNOSIS — F39 EPISODIC MOOD DISORDER (H): ICD-10-CM

## 2024-08-19 DIAGNOSIS — E66.813 CLASS 3 SEVERE OBESITY DUE TO EXCESS CALORIES WITH SERIOUS COMORBIDITY AND BODY MASS INDEX (BMI) OF 40.0 TO 44.9 IN ADULT (H): Primary | ICD-10-CM

## 2024-08-19 ASSESSMENT — PATIENT HEALTH QUESTIONNAIRE - PHQ9: SUM OF ALL RESPONSES TO PHQ QUESTIONS 1-9: 15

## 2024-08-19 ASSESSMENT — PAIN SCALES - GENERAL: PAINLEVEL: SEVERE PAIN (7)
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
## 2024-08-19 NOTE — PROGRESS NOTES
"Medication Therapy Management (MTM) Encounter    ASSESSMENT:                            Medication Adherence/Access: No issues identified    Weight Management   Would benefit from updated weight prior to considering a phentermine dose increase. Blood pressure and pulse stable.     Pain:   Gabapentin, not getting good sleep, and depressive mood could make him feel tired. Could optimize duloxetine dose to see if it helps with pain.     Depression/anxiety:  May benefit from increasing duloxetine dose to help with mood and pain. Has upcoming primary care provider appointment.     PLAN:                            Discuss at upcoming primary care provider appointment.   Consider increasing duloxetine dose to help with mood and pain.   Discuss fatigue. Could try reducing gabapentin dose at noon to see if you feel less tired.     Follow-up: 6 weeks    SUBJECTIVE/OBJECTIVE:                          Bradford Ruelas is a 49 year old male seen for a follow-up visit.       Reason for visit: weight management, sleep, depression.    Allergies/ADRs: Reviewed in chart  Past Medical History: Reviewed in chart  Tobacco: He reports that he has never smoked. He has never been exposed to tobacco smoke. He has never used smokeless tobacco.  Alcohol: tries not to drink; maybe 1-2 beers per week  Has 4 dogs   Follows with outside primary care provider   Medication Adherence/Access: he read meds off med list. GLP-1 RA not covered on insurance.      Weight Management   Phentermine 15 mg once daily in the morning.     Patient reports following with two weight management providers (Maria M Lui PA-C and a provider in [place]). Hasn't been feeling hungry and gets full faster. He feels like he's losing weight but doesn't have a scale to weigh himself. Last visit, discussed trying to sleep more to see if he'd have more energy during the day to be active. His dad recently passed away so he hasn't been as focused on this and still \"sleeps when " "he can\".    Nutrition/Eating Habits: per last visit: Breakfast: egg, piece of toast, coffee. Lunch: wrap or half a chicken sandwich. Dinner: chili or sloppy Joes. Has been cooking at home. No snacks. Just drinking water or an occasional Gatorade.   Exercise/Activity: walking dogs a lot; helps with stress relief. Legs hurt after walking a lot. Lifts some lighter weights. He'd like to lift weights if he had more energy.   Medication History:  none    Starting Weight: 359 lbs on 3/28/24; lost about 40 lbs prior to that when following with Venus CLARK.  Weight goal for hernia surgery: 311 lbs   Weight change: -13 lbs (3.6%)     Current weight today: 0 lbs 0 oz    Wt Readings from Last 4 Encounters:   07/12/24 (!) 156.9 kg (346 lb)   06/24/24 (!) 154.7 kg (341 lb)   03/28/24 (!) 162.8 kg (359 lb)   03/08/24 (!) 163.7 kg (360 lb 14.4 oz)     Estimated body mass index is 44.42 kg/m  as calculated from the following:    Height as of this encounter: 1.88 m (6' 2\").    Weight as of 7/12/24: 156.9 kg (346 lb).    Pain:   Gabapentin 300 mg in the morning, 600 mg at noon, 600 mg at bedtime.   Oxycodone 10 mg 2 times daily   Aspirin 325 mg once daily as needed for pain  Duloxetine 30 mg daily at night - for mood/pain     No bleeding symptoms. Thinks gabapentin is reducing leg pain. He feels drowsy mid-day and usually wants to take a nap.      Depression/anxiety:  Duloxetine 30 mg daily at night   Hydroxyzine 25 in the morning, 50 mg at night  - has been holding this  Patient reports no current medication side effects.     Last visit, reported struggling with depression due to being busy taking care of his dad and not having things to look forward to. His dad has since passed away. He hasn't noticed feeling less tired since holding hydroxyzine. Also hasn't noticed worsened anxiety but has other stressors going on as well.     Today's Vitals: Ht 1.88 m (6' 2\")   BMI 44.42 kg/m    ----------------    I spent 18 minutes with this " patient today. All changes were made via collaborative practice agreement with Maria M Lui PA-C. A copy of the visit note was provided to the patient's provider(s).    A summary of these recommendations was mailed.    Telemedicine Visit Details  Type of service:  Telephone visit  Start Time:  3:42 pm  End Time:  4 pm     Medication Therapy Recommendations  Leg pain, diffuse, right    Current Medication: gabapentin (NEURONTIN) 300 MG capsule   Rationale: Undesirable effect - Adverse medication event - Safety   Recommendation: Decrease Dose   Status: Contact Provider - Awaiting Response

## 2024-08-19 NOTE — LETTER
8/19/2024      RE: Bradford Ruelas   [facility] [address]       Dear Colleague,    Thank you for the opportunity to participate in the care of your patient, Bradford Ruelas, at the [facility] [place] at [facility]. Please see a copy of my visit note below.    Medication Therapy Management (MTM) Encounter    ASSESSMENT:                            Medication Adherence/Access: No issues identified    Weight Management   Would benefit from updated weight prior to considering a phentermine dose increase. Blood pressure and pulse stable.     Pain:   Gabapentin, not getting good sleep, and depressive mood could make him feel tired. Could optimize duloxetine dose to see if it helps with pain.     Depression/anxiety:  May benefit from increasing duloxetine dose to help with mood and pain. Has upcoming primary care provider appointment.     PLAN:                            Discuss at upcoming primary care provider appointment.   Consider increasing duloxetine dose to help with mood and pain.   Discuss fatigue. Could try reducing gabapentin dose at noon to see if you feel less tired.     Follow-up: 6 weeks    SUBJECTIVE/OBJECTIVE:                          Bradford Ruelas is a 49 year old male seen for a follow-up visit.       Reason for visit: weight management, sleep, depression.    Allergies/ADRs: Reviewed in chart  Past Medical History: Reviewed in chart  Tobacco: He reports that he has never smoked. He has never been exposed to tobacco smoke. He has never used smokeless tobacco.  Alcohol: tries not to drink; maybe 1-2 beers per week  Has 4 dogs   Follows with outside primary care provider   Medication Adherence/Access: he read meds off med list. GLP-1 RA not covered on insurance.      Weight Management  Phentermine 15 mg once daily in the morning.     Patient reports following with two weight management providers (Maria M Lui,  "[place] and a provider in [place]). Hasn't been feeling hungry and gets full faster. He feels like he's losing weight but doesn't have a scale to weigh himself. Last visit, discussed trying to sleep more to see if he'd have more energy during the day to be active. His dad recently passed away so he hasn't been as focused on this and still \"sleeps when he can\".    Nutrition/Eating Habits: per last visit: Breakfast: egg, piece of toast, coffee. Lunch: wrap or half a chicken sandwich. Dinner: chili or sloppy Joes. Has been cooking at home. No snacks. Just drinking water or an occasional Gatorade.   Exercise/Activity: walking dogs a lot; helps with stress relief. Legs hurt after walking a lot. Lifts some lighter weights. He'd like to lift weights if he had more energy.   Medication History:  none    Starting Weight: 359 lbs on 3/28/24; lost about 40 lbs prior to that when following with [facility].  Weight goal for hernia surgery: 311 lbs   Weight change: -13 lbs (3.6%)     Current weight today: 0 lbs 0 oz    Wt Readings from Last 4 Encounters:   07/12/24 (!) 156.9 kg (346 lb)   06/24/24 (!) 154.7 kg (341 lb)   03/28/24 (!) 162.8 kg (359 lb)   03/08/24 (!) 163.7 kg (360 lb 14.4 oz)     Estimated body mass index is 44.42 kg/m  as calculated from the following:    Height as of this encounter: 1.88 m (6' 2\").    Weight as of 7/12/24: 156.9 kg (346 lb).    Pain:   Gabapentin 300 mg in the morning, 600 mg at noon, 600 mg at bedtime.   Oxycodone 10 mg 2 times daily   Aspirin 325 mg once daily as needed for pain  Duloxetine 30 mg daily at night - for mood/pain     No bleeding symptoms. Thinks gabapentin is reducing leg pain. He feels drowsy mid-day and usually wants to take a nap.      Depression/anxiety:  Duloxetine 30 mg daily at night   Hydroxyzine 25 in the morning, 50 mg at night  - has been holding this  Patient reports no current medication side effects.     Last visit, reported struggling with depression due to being busy " "taking care of his dad and not having things to look forward to. His dad has since passed away. He hasn't noticed feeling less tired since holding hydroxyzine. Also hasn't noticed worsened anxiety but has other stressors going on as well.     Today's Vitals: Ht 1.88 m (6' 2\")   BMI 44.42 kg/m    ----------------    I spent 18 minutes with this patient today. All changes were made via collaborative practice agreement with Maria M Lui PA-C. A copy of the visit note was provided to the patient's provider(s).    A summary of these recommendations was mailed.    Telemedicine Visit Details  Type of service:  Telephone visit  Start Time:  3:42 pm  End Time:  4 pm     Medication Therapy Recommendations  Leg pain, diffuse, right    Current Medication: gabapentin (NEURONTIN) 300 MG capsule   Rationale: Undesirable effect - Adverse medication event - Safety   Recommendation: Decrease Dose   Status: Contact Provider - Awaiting Response                Please do not hesitate to contact me if you have any questions/concerns.     Sincerely,     HOLGER SMILEY RPH  "

## 2024-08-20 ENCOUNTER — LAB (OUTPATIENT)
Dept: LAB | Facility: CLINIC | Age: 49
End: 2024-08-20
Payer: COMMERCIAL

## 2024-08-20 ENCOUNTER — ANCILLARY PROCEDURE (OUTPATIENT)
Dept: CT IMAGING | Facility: CLINIC | Age: 49
End: 2024-08-20
Attending: UROLOGY
Payer: COMMERCIAL

## 2024-08-20 DIAGNOSIS — N28.89 LEFT RENAL MASS: ICD-10-CM

## 2024-08-20 LAB
ANION GAP SERPL CALCULATED.3IONS-SCNC: 10 MMOL/L (ref 7–15)
BUN SERPL-MCNC: 16.9 MG/DL (ref 6–20)
CALCIUM SERPL-MCNC: 9.6 MG/DL (ref 8.8–10.4)
CHLORIDE SERPL-SCNC: 101 MMOL/L (ref 98–107)
CREAT BLD-MCNC: 1.1 MG/DL (ref 0.7–1.3)
CREAT SERPL-MCNC: 1 MG/DL (ref 0.67–1.17)
EGFRCR SERPLBLD CKD-EPI 2021: >60 ML/MIN/1.73M2
EGFRCR SERPLBLD CKD-EPI 2021: >90 ML/MIN/1.73M2
GLUCOSE SERPL-MCNC: 101 MG/DL (ref 70–99)
HCO3 SERPL-SCNC: 28 MMOL/L (ref 22–29)
POTASSIUM SERPL-SCNC: 4.3 MMOL/L (ref 3.4–5.3)
SODIUM SERPL-SCNC: 139 MMOL/L (ref 135–145)

## 2024-08-20 PROCEDURE — 82565 ASSAY OF CREATININE: CPT | Performed by: PATHOLOGY

## 2024-08-20 PROCEDURE — 74178 CT ABD&PLV WO CNTR FLWD CNTR: CPT | Performed by: RADIOLOGY

## 2024-08-20 PROCEDURE — 36415 COLL VENOUS BLD VENIPUNCTURE: CPT | Performed by: PATHOLOGY

## 2024-08-20 PROCEDURE — 80048 BASIC METABOLIC PNL TOTAL CA: CPT | Performed by: PATHOLOGY

## 2024-08-20 RX ORDER — IOPAMIDOL 755 MG/ML
149 INJECTION, SOLUTION INTRAVASCULAR ONCE
Status: COMPLETED | OUTPATIENT
Start: 2024-08-20 | End: 2024-08-20

## 2024-08-20 RX ADMIN — IOPAMIDOL 149 ML: 755 INJECTION, SOLUTION INTRAVASCULAR at 12:07

## 2024-08-20 NOTE — DISCHARGE INSTRUCTIONS

## 2024-08-21 ENCOUNTER — PRE VISIT (OUTPATIENT)
Dept: UROLOGY | Facility: CLINIC | Age: 49
End: 2024-08-21
Payer: COMMERCIAL

## 2024-08-21 NOTE — TELEPHONE ENCOUNTER
Reason for visit: follow up      Dx/Hx/Sx: left renal mass     Records/imaging/labs/orders: ct and labs completed 8/20/24    At Rooming: standard

## 2024-08-22 ENCOUNTER — TELEPHONE (OUTPATIENT)
Dept: ENDOCRINOLOGY | Facility: CLINIC | Age: 49
End: 2024-08-22
Payer: COMMERCIAL

## 2024-08-22 NOTE — PATIENT INSTRUCTIONS
"Recommendations from today's MTM visit:                                                      Discuss at upcoming primary care provider appointment.   Consider increasing duloxetine dose to help with mood and pain.   Discuss fatigue. Could try reducing gabapentin dose at noon to see if you feel less tired.     Follow-up: 6 weeks    It was great speaking with you today.  I value your experience and would be very thankful for your time in providing feedback in our clinic survey. In the next few days, you may receive an email or text message from [employer] with a link to a survey related to your  clinical pharmacist.\"     To schedule another MTM appointment, please call the clinic directly or you may call the MTM scheduling line at [phone].    My Clinical Pharmacist's contact information:                                                      Please feel free to contact me with any questions or concerns you have.      Roselia Campa, PharmD, [facility]  Medication Therapy Management Pharmacist      "

## 2024-08-26 ENCOUNTER — TELEPHONE (OUTPATIENT)
Dept: ENDOCRINOLOGY | Facility: CLINIC | Age: 49
End: 2024-08-26
Payer: COMMERCIAL

## 2024-08-26 NOTE — TELEPHONE ENCOUNTER
General Call    Contacts       Contact Date/Time Type Contact Phone/Fax    08/26/2024 11:32 AM CDT Phone (Incoming) Bradford Ruelas (Self) [phone] (M)          Reason for Call:  please call pt, needs to talk about whether or not to change med dosage.     Weight has gone up 7 pounds.        Okay to leave a detailed message?: Yes at Cell number on file:    Telephone Information:   Mobile [phone]

## 2024-09-03 ENCOUNTER — OFFICE VISIT (OUTPATIENT)
Dept: UROLOGY | Facility: CLINIC | Age: 49
End: 2024-09-03
Payer: COMMERCIAL

## 2024-09-03 VITALS
SYSTOLIC BLOOD PRESSURE: 122 MMHG | HEART RATE: 61 BPM | HEIGHT: 74 IN | DIASTOLIC BLOOD PRESSURE: 66 MMHG | OXYGEN SATURATION: 98 % | BODY MASS INDEX: 40.43 KG/M2 | WEIGHT: 315 LBS

## 2024-09-03 DIAGNOSIS — N28.89 LEFT RENAL MASS: Primary | ICD-10-CM

## 2024-09-03 PROCEDURE — 99213 OFFICE O/P EST LOW 20 MIN: CPT | Performed by: UROLOGY

## 2024-09-03 ASSESSMENT — PAIN SCALES - GENERAL: PAINLEVEL: NO PAIN (0)
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
## 2024-09-03 NOTE — NURSING NOTE
"No chief complaint on file.      Height 1.88 m (6' 2\"), weight (!) 157.4 kg (347 lb 1.6 oz). Body mass index is 44.57 kg/m .    Patient Active Problem List   Diagnosis    Alcohol dependence in remission (H)    Episodic mood disorder (H24)    Lymphedema    Metabolic syndrome    Mild cognitive disorder    Class 3 severe obesity with serious comorbidity and body mass index (BMI) of 45.0 to 49.9 in adult, unspecified obesity type (H)    Primary hypertension    Left renal mass    Umbilical hernia without obstruction or gangrene    Calcaneal spur, right foot    Migraine headache    MRSA (methicillin resistant staph aureus) culture positive    Traumatic brain injury with loss of consciousness, sequela (H24)    Leg pain, diffuse, right       Allergies   Allergen Reactions    Gramineae Pollens Itching       Current Outpatient Medications   Medication Sig Dispense Refill    amLODIPine (NORVASC) 10 MG tablet       aspirin (ASA) 325 MG tablet Take 325 mg by mouth      atenolol (TENORMIN) 100 MG tablet Take 200 mg by mouth daily      DULoxetine (CYMBALTA) 30 MG capsule Take 30 mg by mouth daily      gabapentin (NEURONTIN) 300 MG capsule 300 mg in the morning, 600 mg at noon, 600 mg at bedtime      hydrochlorothiazide (MICROZIDE) 12.5 MG capsule       hydrOXYzine palomo (VISTARIL) 25 MG capsule Take 25-50 mg by mouth      oxyCODONE (ROXICODONE) 5 MG tablet Take 10 mg by mouth      phentermine 15 MG capsule Take 1 capsule (15 mg) by mouth every morning 30 capsule 4    potassium chloride eladio ER (KLOR-CON M20) 20 MEQ CR tablet Take 40 mEq by mouth 2 times daily      SUMAtriptan (IMITREX) 100 MG tablet Take 100 mg by mouth         Social History     Tobacco Use    Smoking status: Never     Passive exposure: Never    Smokeless tobacco: Never       Gage Seymour  9/3/2024  4:28 PM     "

## 2024-09-05 ENCOUNTER — TELEPHONE (OUTPATIENT)
Dept: ENDOCRINOLOGY | Facility: CLINIC | Age: 49
End: 2024-09-05
Payer: COMMERCIAL

## 2024-09-05 NOTE — TELEPHONE ENCOUNTER
Patient called to let Maria M Lui PA-C know he is down in weight and feeling great. Reminded patient to make sure he is getting at least 60 grams of protein and 48 ounces of water per day.   Patient verbalized understanding. Has follow up with Maria M Walsh in Oct.   PROVIDER:[TOKEN:[[id number]]]

## 2024-09-05 NOTE — TELEPHONE ENCOUNTER
General Call    Contacts       Contact Date/Time Type Contact Phone/Fax    09/05/2024 11:24 AM CDT Phone (Incoming) Bradford Ruelas (Self) [phone] (M)          Reason for Call:  weight loss    What are your questions or concerns:  pt lost 7 pounds since appt      Okay to leave a detailed message?: Yes at Cell number on file:    Telephone Information:   Mobile [phone]

## 2024-09-19 ENCOUNTER — TELEPHONE (OUTPATIENT)
Dept: ENDOCRINOLOGY | Facility: CLINIC | Age: 49
End: 2024-09-19
Payer: COMMERCIAL

## 2024-09-19 NOTE — TELEPHONE ENCOUNTER
Medication Requested:  phentermine 15 MG capsule 30 capsule 4 6/26/2024 -- No   Sig - Route: Take 1 capsule (15 mg) by mouth every morning - Oral     ----------------------    Encounter details:   Pt. called and states he weighs 341 pounds. Pt. is having difficulty refilling his Phentermine 15 mg at [facility] in [address] phone number [phone]. Please contact Pt. at [phone] as he states that the pill bottle said no more refills and Pt. Thought he had 4 refills. Pt. will be out of pills after today as he took his last one this morning. Okay  to leave a detailed message.        Called pharmacy, pt has refill ready for pickup and 2 additional refills on file. Called patient to notify of the same. Sandra Gutierres RN on 9/19/2024 at 12:30 PM

## 2024-09-19 NOTE — TELEPHONE ENCOUNTER
Pt. called and states he weighs 341 pounds. Pt. is having difficulty refilling his Phentermine 15 mg at [facility] in [place] [address] phone number [phone]. Please contact Pt. at [phone] as he states that the pill bottle said no more refills and Pt. Thought he had 4 refills. Pt. will be out of pills after today as he took his last one this morning. Okay  to leave a detailed message.

## 2024-10-04 ENCOUNTER — VIRTUAL VISIT (OUTPATIENT)
Dept: PHARMACY | Facility: CLINIC | Age: 49
End: 2024-10-04
Payer: COMMERCIAL

## 2024-10-04 VITALS — WEIGHT: 315 LBS | BODY MASS INDEX: 43.78 KG/M2

## 2024-10-04 DIAGNOSIS — F39 EPISODIC MOOD DISORDER (H): ICD-10-CM

## 2024-10-04 DIAGNOSIS — M79.604 LEG PAIN, DIFFUSE, RIGHT: ICD-10-CM

## 2024-10-04 DIAGNOSIS — E66.01 CLASS 3 SEVERE OBESITY WITH SERIOUS COMORBIDITY AND BODY MASS INDEX (BMI) OF 45.0 TO 49.9 IN ADULT, UNSPECIFIED OBESITY TYPE (H): Primary | ICD-10-CM

## 2024-10-04 DIAGNOSIS — G62.9 NEUROPATHY: ICD-10-CM

## 2024-10-04 DIAGNOSIS — E66.813 CLASS 3 SEVERE OBESITY WITH SERIOUS COMORBIDITY AND BODY MASS INDEX (BMI) OF 45.0 TO 49.9 IN ADULT, UNSPECIFIED OBESITY TYPE (H): Primary | ICD-10-CM

## 2024-10-04 DIAGNOSIS — T14.8XXA TRAUMATIC HEMATOMA: ICD-10-CM

## 2024-10-04 NOTE — PATIENT INSTRUCTIONS
"Recommendations from today's MTM visit:                                                      Continue phentermine 15 mg once daily   Please discuss the following with your primary care provider:   Could try increasing duloxetine dose to help with pain  Discuss your dizziness symptoms to see if you need an adjustment to your medications     Follow-up: 3 weeks with weight management, 3 months with medication therapy management     It was great speaking with you today.  I value your experience and would be very thankful for your time in providing feedback in our clinic survey. In the next few days, you may receive an email or text message from [employer] with a link to a survey related to your  clinical pharmacist.\"     To schedule another MTM appointment, please call the clinic directly or you may call the MTM scheduling line at [phone].    My Clinical Pharmacist's contact information:                                                      Please feel free to contact me with any questions or concerns you have.      Roselia Campa, PharmD, AAHIVP  Medication Therapy Management Pharmacist      "

## 2024-10-04 NOTE — PROGRESS NOTES
Medication Therapy Management (MTM) Encounter    ASSESSMENT:                            Medication Adherence/Access: No issues identified    Weight Management   Weight loss progressing. Tolerating phentermine well. Continue current dose.    Hypertension   Blood pressure at goal <130/80. Pulse 50-61. Since he's feeling dizzy, could consider reducing a dose of his antihypertensive medications. Will let primary care provider know.    Traumatic hematoma/pain/neuropathy:   Since using oxycodone more than prescribed, could try increasing duloxetine dose to help with pain. Will recommend to primary care provider.     PLAN:                            Continue phentermine 15 mg once daily   Will let primary care provider know that patient has been using oxycodone more frequently - could consider increasing duloxetine dose to see if it helps to manage pain. Will also let primary care provider know patient has been feeling dizzy sometimes - should be evaluated further.    Follow-up: 3 weeks with weight management, 3 months with medication therapy management     SUBJECTIVE/OBJECTIVE:                          Bradford Ruelas is a 49 year old male seen for a follow-up visit.       Reason for visit: weight management follow-up.    Allergies/ADRs: Reviewed in chart  Past Medical History: Reviewed in chart  Tobacco: He reports that he has never smoked. He has never been exposed to tobacco smoke. He has never used smokeless tobacco.  Alcohol:   Medication Adherence/Access: no issues reported    Weight Management   Phentermine 15 mg once daily in the morning.     Patient reports following with two weight management providers (Maria M Lui PA-C and a provider in [place]). Phentermine has been helping him to feel less hungry. Has been eating smaller portions. Occasionally feels dizzy.     Exercise/Activity: walking dogs a lot; helps with stress relief. Legs hurt after walking a lot. Lifts some lighter weights. He'd like to lift  "weights if he had more energy.   Medication History:  none    Starting Weight: 359 lbs on 3/28/24; lost about 40 lbs prior to that when following with Venus CLARK.  Weight goal for hernia surgery: 311 lbs      Current weight today: 341 lbs 0 oz  Cumulative Weight Loss: -18 lb, -5% from baseline    Wt Readings from Last 4 Encounters:   10/04/24 (!) 341 lb (154.7 kg)   09/03/24 (!) 347 lb 1.6 oz (157.4 kg)   07/12/24 (!) 346 lb (156.9 kg)   06/24/24 (!) 341 lb (154.7 kg)     Estimated body mass index is 43.78 kg/m  as calculated from the following:    Height as of 9/3/24: 6' 2\" (1.88 m).    Weight as of this encounter: 341 lb (154.7 kg).      Hypertension   Amlodipine 10 mg once daily  Atenolol 200 mg once daily   Hydrochlorothiazide 12.5 mg once daily   Potassium chloride 40 MEQ 2 times daily   Patient reports he sometimes feels dizzy.     BP Readings from Last 3 Encounters:   09/03/24 122/66   06/24/24 115/73   03/28/24 127/79     Traumatic hematoma/pain/neuropathy:   Oxycodone 10 mg 2 times daily - has been taking an extra 10 mg in the middle of the day for pain.   Gabapentin 300 mg in the morning, 600 mg in the afternoon, 600 mg at night.   Duloxetine 30 mg once daily   Acetaminophen as needed     Acetaminophen isn't helpful. Feels tired during the day and occasionally dizzy. No constipation.     Today's Vitals: Wt (!) 341 lb (154.7 kg)   BMI 43.78 kg/m    ----------------  I spent 19 minutes with this patient today. All changes were made via collaborative practice agreement with Maria M Lui PA-C  . A copy of the visit note was provided to the patient's provider(s).    A summary of these recommendations was mailed.     Telemedicine Visit Details  The patient's medications can be safely assessed via a telemedicine encounter.  Type of service:  Telephone visit  Originating Location (pt. Location): Home    Distant Location (provider location):  Off-site  Start Time: 3:35 PM  End Time: 3:54 PM     Medication " Therapy Recommendations  No medication therapy recommendations to display

## 2024-10-04 NOTE — NURSING NOTE
Current patient location:  [facility] [place], [place]    Is the patient currently in the state of [place]? NO    Visit mode:TELEPHONE    If the visit is dropped, the patient can be reconnected by: TELEPHONE VISIT: Phone number: [phone]    Will anyone else be joining the visit? NO  (If patient encounters technical issues they should call [phone]    How would you like to obtain your AVS? Mail a copy    Are changes needed to the allergy or medication list? No    Are refills needed on medications prescribed by this physician? YES phentermine    Reason for visit: Medication Therapy Management    Shante Del Toro VVF

## 2024-10-04 NOTE — PROGRESS NOTES
"Virtual Visit Details    Type of service:  Telephone Visit   Phone call duration: *** minutes   Originating Location (pt. Location): {patient location:467593::\"Home\"}  {PROVIDER LOCATION On-site should be selected for visits conducted from your clinic location or adjoining [facility] hospital, academic office, or other nearby [facility] building. Off-site should be selected for all other provider locations, including home:475194}  Distant Location (provider location):  {virtual location provider:623505}      "

## 2024-10-24 ENCOUNTER — OFFICE VISIT (OUTPATIENT)
Dept: ENDOCRINOLOGY | Facility: CLINIC | Age: 49
End: 2024-10-24
Payer: COMMERCIAL

## 2024-10-24 VITALS
DIASTOLIC BLOOD PRESSURE: 69 MMHG | SYSTOLIC BLOOD PRESSURE: 122 MMHG | OXYGEN SATURATION: 99 % | HEIGHT: 74 IN | WEIGHT: 315 LBS | HEART RATE: 72 BPM | BODY MASS INDEX: 40.43 KG/M2

## 2024-10-24 DIAGNOSIS — E66.01 CLASS 3 SEVERE OBESITY WITH SERIOUS COMORBIDITY AND BODY MASS INDEX (BMI) OF 45.0 TO 49.9 IN ADULT, UNSPECIFIED OBESITY TYPE (H): ICD-10-CM

## 2024-10-24 DIAGNOSIS — E66.813 CLASS 3 SEVERE OBESITY WITH SERIOUS COMORBIDITY AND BODY MASS INDEX (BMI) OF 45.0 TO 49.9 IN ADULT, UNSPECIFIED OBESITY TYPE (H): ICD-10-CM

## 2024-10-24 PROCEDURE — G2211 COMPLEX E/M VISIT ADD ON: HCPCS

## 2024-10-24 PROCEDURE — 99213 OFFICE O/P EST LOW 20 MIN: CPT

## 2024-10-24 RX ORDER — PHENTERMINE HYDROCHLORIDE 15 MG/1
15 CAPSULE ORAL EVERY MORNING
Qty: 30 CAPSULE | Refills: 4 | Status: SHIPPED | OUTPATIENT
Start: 2024-10-24

## 2024-10-24 ASSESSMENT — PAIN SCALES - GENERAL: PAINLEVEL_OUTOF10: SEVERE PAIN (7)
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
## 2024-10-24 NOTE — PATIENT INSTRUCTIONS
"Pascual Felder,  Thank you for allowing us the privilege of caring for you. We hope we provided you with the excellent service you deserve.   Please let us know if there is anything else we can do for you so that we can be sure you are completely satisfied with your care experience.    To ensure the quality of our services you may be receiving a patient satisfaction survey from an independent patient satisfaction monitoring company.    The greatest compliment you can give is a \"Likely to Recommend\"    Your visit was with Katherine BEBO Lui today.    Instructions per today's visit:     Continue Phentermine 15mg in the morning. Refills sent   Maria M Walsh in 3 months     ___________________________________________________________________________  Important contact and scheduling information:  Please call our contact center at [phone] to schedule your next appointments.  For any nursing questions or concerns call Pattie Love LPN at [phone] or Sarah Sotomayor RN at [phone]  Please call during clinic hours Monday through Friday 8:00a - 4:00p if you have questions or you can contact us via [employer] at anytime and we will reply during clinic hours.    Lab results will be communicated through My Chart or letter (if My Chart not used). Please call the clinic if you have not received communication after 1 week or if you have any questions.?  Clinic Fax: [phone]  __________________________________________________________________________    If labs were ordered today:    Please make an appointment to have them drawn at your convenience.     To schedule the Lab Appointment using [employer]:  Select \"Schedule an Appointment\"  Select \"Lab Only\"  For \"A couple of questions\", select \"Other\"  For \"Which locations work for you?, select the location and set up the appointment    To schedule by phone call [phone] to schedule a lab only appointment at [facility] " lab.  ___________________________________________________________________________  Work with A Health !  Virtual Sessions are Available through [facility] Weight Management Clinics    To learn more, call to schedule a free, Health  Q&A appointment: [phone]     What is Health Coaching?  Do you know what you are supposed to do, but you just aren't doing it?  Then, HEALTH COACHING may help you!   Get unstuck and move forward with the support of a professionally trained NBC-HWC (National Board-Certified Health and ) who uses evidence-based approaches to help you move forward with healthy lifestyle changes in the areas of weight loss, stress management and overall well-being.    Health Coaches help you identify goals that will work best for you. Health Coaches provide support and encouragement with overcoming barriers and help you to find inspiration and motivation to lead a healthy lifestyle.    Option one:  Health Coaching 3-Pack; Three, 30-minute Health Coaching Visits, for $99  Visits are done virtually (phone or video)  This is a self pay service; we do not accept insurance for john coaching.    Option two:   The 24 week Plan; 11 Health Coaching Visits, and a 7 months subscription to [employer]-- on-demand fitness, nutrition and mindfulness classes, for $499 (employee discounts may be available). Participants will also meet regularly with a weight management Medical Provider and a Registered/Licensed Dietician.  This is a self-pay service; we do not accept insurance for health coaching.    To Schedule a free Health  Q&A appointment to learn more,  call [phone].  ____________________________________________________________________  [facility]  Healthy Lifestyle Group    Healthy Lifestyle Group  This is a 60 minute virtual coaching group for those who want to lead a healthier lifestyle. Come together to set goals and overcome  "barriers in a supportive group environment. We will address the four pillars of health--nutrition, exercise, sleep and emotional well-being.  This group is highly recommended for those who are participating in the 24 week Healthy Lifestyle Plan and our Health Coaching sessions.    WHEN: This group meets the first Friday of the month, 12:30 PM - 1:30 PM online, via a zoom meeting.      FACILITATOR: Led by National Board Certified Health and , Yelitza Wolff [facility].    TO REGISTER: Please call the Call Center at [phone] to register. You will get an appointment to attend in [employer]. Fifteen minutes prior to the meeting, complete the e-check in and you will get the link to join the meeting.  There is no charge to attend this group and space is limited.      2023 and 2024 Meeting Topics and Dates:    November 3: Introduction to Mindfulness (Learn simple and effective mindfulness practices and how it can benefit you)    December 8: Let's Talk (guided discussion on our wins and challenges)    January 5: New Years Vision: Manifest your Best 2024! (Guided imagery,  journaling and discussion)    February 2: Let's Talk    March 1: 10 Percent Happier by Raj Godwin (Book Bites; a guided discussion on the nuggets of wisdom from favorite wellness books; no need to read the book but highly encouraged)    April 5: Let's Talk    May 3: \"Essentialism; The Disciplined Pursuit of Less by Tyson Fortune (book bites discussion)    June 7: Let's Talk    July 5: NO MEETING, off for the 4th of July Holiday    August 2: The Blue Zones, Secrets for Living a Longer Life by Raj Faria (book bites discussion)      If you would like bariatric surgery specific support group info please let your care team know.         Thank you,   [facility] Comprehensive Weight Management Team                          "

## 2024-10-24 NOTE — NURSING NOTE
"(   Chief Complaint   Patient presents with    RECHECK     Return MW    )    ( Weight: (!) 157 kg (346 lb 3.2 oz) )  ( Height: 188 cm (6' 2\") )  ( BMI (Calculated): 44.45 )  (   )  (   )  (   )  (   )  ( Waist Circumference (cm): 136 cm )  (   )    ( BP: (!) 142/81 )  (   )  (   )  (   )  ( Pulse: 72 )  (   )  ( SpO2: 99 % )    (   Patient Active Problem List   Diagnosis    Alcohol dependence in remission (H)    Episodic mood disorder (H)    Lymphedema    Metabolic syndrome    Mild cognitive disorder    Class 3 severe obesity with serious comorbidity and body mass index (BMI) of 45.0 to 49.9 in adult, unspecified obesity type (H)    Primary hypertension    Left renal mass    Umbilical hernia without obstruction or gangrene    Calcaneal spur, right foot    Migraine headache    MRSA (methicillin resistant staph aureus) culture positive    Traumatic brain injury with loss of consciousness, sequela (H)    Leg pain, diffuse, right    )  (   Current Outpatient Medications   Medication Sig Dispense Refill    amLODIPine (NORVASC) 10 MG tablet       aspirin (ASA) 325 MG tablet Take 325 mg by mouth      atenolol (TENORMIN) 100 MG tablet Take 200 mg by mouth daily      DULoxetine (CYMBALTA) 30 MG capsule Take 60 mg by mouth daily.      gabapentin (NEURONTIN) 300 MG capsule 300 mg in the morning, 600 mg at noon, 600 mg at bedtime      hydrochlorothiazide (MICROZIDE) 12.5 MG capsule       hydrOXYzine palomo (VISTARIL) 25 MG capsule Take 50 mg by mouth.      oxyCODONE (ROXICODONE) 5 MG tablet Take 10 mg by mouth      phentermine 15 MG capsule Take 1 capsule (15 mg) by mouth every morning 30 capsule 4    potassium chloride eladio ER (KLOR-CON M20) 20 MEQ CR tablet Take 40 mEq by mouth 2 times daily      SUMAtriptan (IMITREX) 100 MG tablet Take 100 mg by mouth      )  ( Diabetes Eval:    )    ( Pain Eval:  Severe Pain (7) )    ( Wound Eval:       )    (   History   Smoking Status    Never   Smokeless Tobacco    Never    )    ( Signed " By:  Lucas Larson, EMT; October 24, 2024; 2:35 PM )
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
## 2025-03-04 ENCOUNTER — LAB (OUTPATIENT)
Dept: LAB | Facility: CLINIC | Age: 50
End: 2025-03-04
Payer: COMMERCIAL

## 2025-03-04 ENCOUNTER — ANCILLARY PROCEDURE (OUTPATIENT)
Dept: CT IMAGING | Facility: CLINIC | Age: 50
End: 2025-03-04
Attending: UROLOGY
Payer: COMMERCIAL

## 2025-03-04 ENCOUNTER — PRE VISIT (OUTPATIENT)
Dept: UROLOGY | Facility: CLINIC | Age: 50
End: 2025-03-04

## 2025-03-04 ENCOUNTER — OFFICE VISIT (OUTPATIENT)
Dept: UROLOGY | Facility: CLINIC | Age: 50
End: 2025-03-04
Payer: COMMERCIAL

## 2025-03-04 VITALS — HEART RATE: 69 BPM | SYSTOLIC BLOOD PRESSURE: 130 MMHG | DIASTOLIC BLOOD PRESSURE: 81 MMHG | OXYGEN SATURATION: 97 %

## 2025-03-04 DIAGNOSIS — N28.89 LEFT RENAL MASS: ICD-10-CM

## 2025-03-04 DIAGNOSIS — N28.89 LEFT RENAL MASS: Primary | ICD-10-CM

## 2025-03-04 LAB
ANION GAP SERPL CALCULATED.3IONS-SCNC: 10 MMOL/L (ref 7–15)
BUN SERPL-MCNC: 17.3 MG/DL (ref 6–20)
CALCIUM SERPL-MCNC: 9.5 MG/DL (ref 8.8–10.4)
CHLORIDE SERPL-SCNC: 102 MMOL/L (ref 98–107)
CREAT BLD-MCNC: 0.9 MG/DL (ref 0.7–1.3)
CREAT SERPL-MCNC: 0.94 MG/DL (ref 0.67–1.17)
EGFRCR SERPLBLD CKD-EPI 2021: >60 ML/MIN/1.73M2
EGFRCR SERPLBLD CKD-EPI 2021: >90 ML/MIN/1.73M2
GLUCOSE SERPL-MCNC: 96 MG/DL (ref 70–99)
HCO3 SERPL-SCNC: 28 MMOL/L (ref 22–29)
POTASSIUM SERPL-SCNC: 4.2 MMOL/L (ref 3.4–5.3)
SODIUM SERPL-SCNC: 140 MMOL/L (ref 135–145)

## 2025-03-04 PROCEDURE — 80048 BASIC METABOLIC PNL TOTAL CA: CPT | Performed by: PATHOLOGY

## 2025-03-04 PROCEDURE — 36415 COLL VENOUS BLD VENIPUNCTURE: CPT | Performed by: PATHOLOGY

## 2025-03-04 PROCEDURE — 74178 CT ABD&PLV WO CNTR FLWD CNTR: CPT | Performed by: RADIOLOGY

## 2025-03-04 RX ORDER — IOPAMIDOL 755 MG/ML
149 INJECTION, SOLUTION INTRAVASCULAR ONCE
Status: COMPLETED | OUTPATIENT
Start: 2025-03-04 | End: 2025-03-04

## 2025-03-04 RX ADMIN — IOPAMIDOL 149 ML: 755 INJECTION, SOLUTION INTRAVASCULAR at 13:44

## 2025-03-04 ASSESSMENT — PAIN SCALES - GENERAL: PAINLEVEL_OUTOF10: NO PAIN (0)
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
## 2025-03-04 NOTE — TELEPHONE ENCOUNTER
Reason for visit: follow up      Dx/Hx/Sx: left renal mass     Records/imaging/labs/orders: in epic     At Rooming: standard

## 2025-03-04 NOTE — NURSING NOTE
Bradford Ruelas is a 49 year old male patient that presents today in clinic for the following:    Chief Complaint   Patient presents with    Follow Up     Left renal mass       The patient's allergies and medications were reviewed as noted. A set of vitals were recorded as noted without incident. The patient does not have any other questions for the provider.    Blood pressure 130/81, pulse 69, SpO2 97%. There is no height or weight on file to calculate BMI.    Patient Active Problem List   Diagnosis    Alcohol dependence in remission (H)    Episodic mood disorder    Lymphedema    Metabolic syndrome    Mild cognitive disorder    Class 3 severe obesity with serious comorbidity and body mass index (BMI) of 45.0 to 49.9 in adult, unspecified obesity type (H)    Primary hypertension    Left renal mass    Umbilical hernia without obstruction or gangrene    Calcaneal spur, right foot    Migraine headache    MRSA (methicillin resistant staph aureus) culture positive    Traumatic brain injury with loss of consciousness, sequela    Leg pain, diffuse, right       Allergies   Allergen Reactions    Gramineae Pollens Itching       Current Outpatient Medications   Medication Sig Dispense Refill    amLODIPine (NORVASC) 10 MG tablet       aspirin (ASA) 325 MG tablet Take 325 mg by mouth      atenolol (TENORMIN) 100 MG tablet Take 200 mg by mouth daily      DULoxetine (CYMBALTA) 30 MG capsule Take 60 mg by mouth daily.      gabapentin (NEURONTIN) 300 MG capsule 300 mg in the morning, 600 mg at noon, 600 mg at bedtime      hydrochlorothiazide (MICROZIDE) 12.5 MG capsule       hydrOXYzine palomo (VISTARIL) 25 MG capsule Take 50 mg by mouth.      oxyCODONE (ROXICODONE) 5 MG tablet Take 10 mg by mouth      phentermine 15 MG capsule Take 1 capsule (15 mg) by mouth every morning. 30 capsule 4    potassium chloride eladio ER (KLOR-CON M20) 20 MEQ CR tablet Take 40 mEq by mouth 2 times daily      SUMAtriptan (IMITREX) 100 MG tablet Take 100  mg by mouth         Social History     Tobacco Use    Smoking status: Never     Passive exposure: Never    Smokeless tobacco: Never       Tram Schwarz LPN  3/4/2025  2:40 PM

## 2025-03-04 NOTE — DISCHARGE INSTRUCTIONS

## 2025-03-04 NOTE — Clinical Note
3/4/2025       RE: Bradford Ruelas   [facility] [address]     Dear Colleague,    Thank you for referring your patient, Bradford Ruelas, to the [facility]. Please see a copy of my visit note below.    No notes on file    Again, thank you for allowing me to participate in the care of your patient.      Sincerely,    Aurelio Long MD

## 2025-03-26 DIAGNOSIS — E66.01 CLASS 3 SEVERE OBESITY WITH SERIOUS COMORBIDITY AND BODY MASS INDEX (BMI) OF 45.0 TO 49.9 IN ADULT, UNSPECIFIED OBESITY TYPE (H): ICD-10-CM

## 2025-03-26 DIAGNOSIS — E66.813 CLASS 3 SEVERE OBESITY WITH SERIOUS COMORBIDITY AND BODY MASS INDEX (BMI) OF 45.0 TO 49.9 IN ADULT, UNSPECIFIED OBESITY TYPE (H): ICD-10-CM

## 2025-03-26 NOTE — TELEPHONE ENCOUNTER
Last Written Prescription:  phentermine 15 MG capsule 30 capsule 4 10/24/2024 -- No   Sig - Route: Take 1 capsule (15 mg) by mouth every morning. - Oral     ----------------------  Last Visit Date: 10-24-24  Future Visit Date: 3-31-25    Wt Mgmt protocol:  CR 3-4-25  Creatinine  0.67 - 1.17 mg/dL 0.94     BP1 30/81  as of 3/4/2025   Pulse  69  as of 3/4/2025   -    Refill decision: Medication unable to be refilled by RN due to:   Controlled medication        Request from pharmacy:  Requested Prescriptions   Pending Prescriptions Disp Refills    phentermine 15 MG capsule 30 capsule 0     Sig: Take 1 capsule (15 mg) by mouth every morning.       There is no refill protocol information for this order

## 2025-03-26 NOTE — TELEPHONE ENCOUNTER
Medication Question or Refill    Contacts       Contact Date/Time Type Contact Phone/Fax    03/26/2025 11:58 AM CDT Phone (Incoming) Bradford Ruelas (Self) [phone] (M)            What medication are you calling about (include dose and sig)?: Phentermine    Preferred Pharmacy:   [facility] [place] - [place], [place] - [address]  [address] [place] [address]  Phone: [phone] Fax: [phone]      Controlled Substance Agreement on file:   CSA -- Patient Level:    CSA: None found at the patient level.       Who prescribed the medication?: Maria M Walsh    Do you need a refill? Yes    When did you use the medication last? Yesterday morning and didn't have any for today    Patient offered an appointment? No    Do you have any questions or concerns?  Yes: wondering about an increase in dosage. Is currently at 15 mg      Okay to leave a detailed message?: Yes at Cell number on file:    Telephone Information:   Mobile [phone]

## 2025-03-27 RX ORDER — PHENTERMINE HYDROCHLORIDE 15 MG/1
15 CAPSULE ORAL EVERY MORNING
Qty: 30 CAPSULE | Refills: 0 | Status: SHIPPED | OUTPATIENT
Start: 2025-03-27

## 2025-04-28 DIAGNOSIS — E66.813 CLASS 3 SEVERE OBESITY WITH SERIOUS COMORBIDITY AND BODY MASS INDEX (BMI) OF 45.0 TO 49.9 IN ADULT, UNSPECIFIED OBESITY TYPE (H): ICD-10-CM

## 2025-04-28 NOTE — TELEPHONE ENCOUNTER
Medication Question or Refill    Contacts       Contact Date/Time Type Contact Phone/Fax    04/28/2025 10:09 AM CDT Phone (Incoming) Bradford Ruelas (Self) [phone] (M)            What medication are you calling about (include dose and sig)?:     phentermine 15 MG capsule     Preferred Pharmacy:  [address] [address]  Phone: [phone] Fax: [phone]      Controlled Substance Agreement on file:   CSA -- Patient Level:    CSA: None found at the patient level.       Who prescribed the medication?: Hjelm    Do you need a refill? Yes    When did you use the medication last? 4 days ago, out    Patient offered an appointment? Yes: has one    Do you have any questions or concerns?  Yes:     PLEASE call, out, and getting no response.      Okay to leave a detailed message?: Yes at Cell number on file:    Telephone Information:   Mobile [phone]

## 2025-04-28 NOTE — TELEPHONE ENCOUNTER
Last Written Prescription:  phentermine 15 MG capsule   30 capsule 0 3/27/2025     ----------------------  Last Visit Date: 10-  Future Visit Date: 8-  ----------------------    Refill decision: Medication unable to be refilled by RN due to:   Other:   see below  Creatinine   Date Value Ref Range Status   03/04/2025 0.94 0.67 - 1.17 mg/dL Final     Creatinine POCT   Date Value Ref Range Status   03/04/2025 0.9 0.7 - 1.3 mg/dL Final   Labs completed on :   3-4-2025  GFR Estimate  >60 mL/min/1.73m2 >90       Request from pharmacy:  Requested Prescriptions   Pending Prescriptions Disp Refills    phentermine 15 MG capsule 30 capsule 0     Sig: Take 1 capsule (15 mg) by mouth every morning.       Rx Protocol Controlled Substance Failed - 4/28/2025 11:50 AM        Failed - Visit with relevant provider in past 3 months or upcoming 3 months        Failed - Urine drug screeen results on file in past 12 months     [unfilled]           Failed - Controlled Substance Agreement on file in last 12 months     Please review last Controlled Substance Pain agreement document.   CSA -- Encounter Level:    CSA: None found at the encounter level.       CSA -- Patient Level:    CSA: None found at the patient level.               Failed - Auto Fail - Please forward to Provider        Passed - Medication is active on med list and the sig matches        Passed - Medication not refilled in past 28 days     Invalid Medication Grouper

## 2025-04-29 RX ORDER — PHENTERMINE HYDROCHLORIDE 15 MG/1
15 CAPSULE ORAL EVERY MORNING
Qty: 30 CAPSULE | Refills: 3 | Status: SHIPPED | OUTPATIENT
Start: 2025-04-29

## 2025-06-02 ENCOUNTER — TELEPHONE (OUTPATIENT)
Dept: ENDOCRINOLOGY | Facility: CLINIC | Age: 50
End: 2025-06-02
Payer: COMMERCIAL

## 2025-06-02 NOTE — TELEPHONE ENCOUNTER
phentermine 15 MG capsule 30 capsule 3 4/29/2025 -- No   Sig - Route: Take 1 capsule (15 mg) by mouth every morning. - Oral   Sent to pharmacy as: Phentermine HCl 15 MG Oral Capsule   Class: E-Prescribe   Order: [id number]   E-Prescribing Status: Receipt confirmed by pharmacy (4/29/2025  1:25 PM CDT)     [facility] [place], [place] - [address] [place] [address]     Pharmacy was called and they confirm that they have refills available. They also have refill ready for him for pickup    Kiana Ruiz RN  P Central Nursing/Red Flag Triage & Med Refill Team

## 2025-06-02 NOTE — TELEPHONE ENCOUNTER
Medication Question or Refill    Contacts       Contact Date/Time Type Contact Phone/Fax    06/02/2025 08:18 AM CDT Phone (Incoming) Bradford Ruelas (Self) [phone] (M)            What medication are you calling about (include dose and sig)?:     phentermine 15 MG capsule     Preferred Pharmacy:  [address] [address]  Phone: [phone] Fax: [phone]      Controlled Substance Agreement on file:   CSA -- Patient Level:    CSA: None found at the patient level.       Who prescribed the medication?: Hjelm    Do you need a refill? Yes    When did you use the medication last? Today, last pill for franc     Patient offered an appointment? Yes: August 11    Do you have any questions or concerns?  Yes:     Please call as has appt and will be out tomorrow.    Okay to leave a detailed message?: Yes at Cell number on file:    Telephone Information:   Mobile [phone]

## 2025-07-01 ENCOUNTER — TELEPHONE (OUTPATIENT)
Dept: ENDOCRINOLOGY | Facility: CLINIC | Age: 50
End: 2025-07-01
Payer: COMMERCIAL

## 2025-07-01 DIAGNOSIS — E66.813 CLASS 3 SEVERE OBESITY WITH SERIOUS COMORBIDITY AND BODY MASS INDEX (BMI) OF 45.0 TO 49.9 IN ADULT, UNSPECIFIED OBESITY TYPE (H): ICD-10-CM

## 2025-07-01 RX ORDER — PHENTERMINE HYDROCHLORIDE 15 MG/1
15 CAPSULE ORAL EVERY MORNING
Qty: 30 CAPSULE | Refills: 3 | Status: CANCELLED | OUTPATIENT
Start: 2025-07-01

## 2025-07-01 NOTE — TELEPHONE ENCOUNTER
phentermine 15 MG capsule   Start: 04/29/2025   Disp 30  R  3    Refill on file at pharm . Called pt w info.

## 2025-07-01 NOTE — TELEPHONE ENCOUNTER
Medication Question or Refill    Contacts       Contact Date/Time Type Contact Phone/Fax    07/01/2025 10:09 AM CDT Phone (Incoming) Bradford Ruelas (Self) [phone] (M)            What medication are you calling about (include dose and sig)?:     phentermine 15 MG capsule     Preferred Pharmacy:    [address] [address]  Phone: [phone] Fax: [phone]      Controlled Substance Agreement on file:   CSA -- Patient Level:    CSA: None found at the patient level.       Who prescribed the medication?: Hjelm    Do you need a refill? Yes    When did you use the medication last? Today, only has one pill left    Patient offered an appointment? Yes:  8/11    Do you have any questions or concerns?  Yes:     Please call to confirm new script      Okay to leave a detailed message?: Yes at Cell number on file:    Telephone Information:   Mobile [phone]

## 2025-07-29 ENCOUNTER — TELEPHONE (OUTPATIENT)
Dept: ENDOCRINOLOGY | Facility: CLINIC | Age: 50
End: 2025-07-29
Payer: COMMERCIAL

## 2025-07-29 NOTE — TELEPHONE ENCOUNTER
Medication Question or Refill    Contacts       Contact Date/Time Type Contact Phone/Fax    07/29/2025 04:07 PM CDT Phone (Incoming) GonzálezlenyBradford S (Self) [phone] (M)            What medication are you calling about (include dose and sig)?: Phentermine 15mg    Preferred Pharmacy:   [facility] [place], [place] - [address]  [address]  [place] [address]  Phone: [phone] Fax: [phone]      Controlled Substance Agreement on file:   CSA -- Patient Level:    CSA: None found at the patient level.       Who prescribed the medication?: Katherine Hjelm    Do you need a refill? Yes    When did you use the medication last? This morning, 07/29/2025.    Patient offered an appointment? No, Pt. Has an appointment scheduled with Maria M Walsh.     Do you have any questions or concerns?  Yes: Pt. States he has one more refill left but that doesn't always mean anything he states. Pt. Has one more Phentermine left and needs a refill please. Pt. has lost 11. 6 pounds. Pt. Has called his prescription in for a refill. Pt. Is going to go in tomorrow to get his prescriptions at the [facility].      Okay to leave a detailed message?: Yes at Cell number on file:    Telephone Information:   Mobile [phone]

## 2025-07-30 NOTE — TELEPHONE ENCOUNTER
Last Written Prescription:  phentermine 15 MG capsule 30 capsule 3 4/29/2025 -- No   Sig - Route: Take 1 capsule (15 mg) by mouth every morning. - Oral     ----------------------  Last Visit Date: 10-24-25  Future Visit Date: 8-11-25  ----------------------  RX AVAILABLE   Pharm called- pt picked up refill ( 30 caps)this AM    Request from pharmacy:  Requested Prescriptions

## 2025-08-11 ENCOUNTER — OFFICE VISIT (OUTPATIENT)
Dept: ENDOCRINOLOGY | Facility: CLINIC | Age: 50
End: 2025-08-11
Payer: COMMERCIAL

## 2025-08-11 VITALS
BODY MASS INDEX: 40.43 KG/M2 | OXYGEN SATURATION: 99 % | HEIGHT: 74 IN | HEART RATE: 59 BPM | SYSTOLIC BLOOD PRESSURE: 107 MMHG | DIASTOLIC BLOOD PRESSURE: 65 MMHG | WEIGHT: 315 LBS

## 2025-08-11 DIAGNOSIS — E66.813 CLASS 3 SEVERE OBESITY WITH SERIOUS COMORBIDITY AND BODY MASS INDEX (BMI) OF 45.0 TO 49.9 IN ADULT, UNSPECIFIED OBESITY TYPE (H): Primary | ICD-10-CM

## 2025-08-11 RX ORDER — NAPROXEN 500 MG/1
500 TABLET ORAL
COMMUNITY
Start: 2025-07-28

## 2025-08-11 RX ORDER — RIZATRIPTAN BENZOATE 10 MG/1
10 TABLET ORAL
COMMUNITY
Start: 2025-03-06

## 2025-08-11 RX ORDER — PHENTERMINE HYDROCHLORIDE 30 MG/1
30 CAPSULE ORAL EVERY MORNING
Qty: 30 CAPSULE | Refills: 4 | Status: SHIPPED | OUTPATIENT
Start: 2025-08-11

## 2025-08-11 RX ORDER — PHENTERMINE HYDROCHLORIDE 30 MG/1
30 CAPSULE ORAL EVERY MORNING
Qty: 30 CAPSULE | Refills: 4 | Status: SHIPPED | OUTPATIENT
Start: 2025-08-11 | End: 2025-08-11

## 2025-08-11 RX ORDER — TOPIRAMATE 25 MG/1
25 TABLET, FILM COATED ORAL DAILY
COMMUNITY
Start: 2025-04-28

## 2025-08-11 RX ORDER — KETOROLAC TROMETHAMINE 10 MG/1
TABLET, FILM COATED ORAL
COMMUNITY
Start: 2025-06-16

## 2025-08-11 ASSESSMENT — PAIN SCALES - GENERAL: PAINLEVEL_OUTOF10: SEVERE PAIN (9)
